# Patient Record
Sex: FEMALE | Race: WHITE | NOT HISPANIC OR LATINO | Employment: OTHER | URBAN - METROPOLITAN AREA
[De-identification: names, ages, dates, MRNs, and addresses within clinical notes are randomized per-mention and may not be internally consistent; named-entity substitution may affect disease eponyms.]

---

## 2018-07-24 ENCOUNTER — TRANSFERRED RECORDS (OUTPATIENT)
Dept: HEALTH INFORMATION MANAGEMENT | Facility: CLINIC | Age: 28
End: 2018-07-24

## 2018-12-27 ENCOUNTER — TRANSFERRED RECORDS (OUTPATIENT)
Dept: HEALTH INFORMATION MANAGEMENT | Facility: CLINIC | Age: 28
End: 2018-12-27

## 2019-05-09 ENCOUNTER — OFFICE VISIT (OUTPATIENT)
Dept: FAMILY MEDICINE | Facility: CLINIC | Age: 29
End: 2019-05-09
Payer: COMMERCIAL

## 2019-05-09 VITALS
RESPIRATION RATE: 17 BRPM | WEIGHT: 153.7 LBS | SYSTOLIC BLOOD PRESSURE: 120 MMHG | HEART RATE: 92 BPM | DIASTOLIC BLOOD PRESSURE: 68 MMHG | TEMPERATURE: 97.8 F | OXYGEN SATURATION: 98 %

## 2019-05-09 DIAGNOSIS — Z97.8 PRESENCE OF INTRATHECAL BACLOFEN PUMP: ICD-10-CM

## 2019-05-09 DIAGNOSIS — G80.0 SPASTIC QUADRIPLEGIC CEREBRAL PALSY (H): ICD-10-CM

## 2019-05-09 DIAGNOSIS — Z00.00 ROUTINE HISTORY AND PHYSICAL EXAMINATION OF ADULT: Primary | ICD-10-CM

## 2019-05-09 DIAGNOSIS — E78.5 HYPERLIPIDEMIA LDL GOAL <160: ICD-10-CM

## 2019-05-09 PROCEDURE — 99385 PREV VISIT NEW AGE 18-39: CPT | Performed by: INTERNAL MEDICINE

## 2019-05-09 ASSESSMENT — ENCOUNTER SYMPTOMS
PARESTHESIAS: 0
DIZZINESS: 0
FEVER: 0
DYSURIA: 0
SHORTNESS OF BREATH: 0
NERVOUS/ANXIOUS: 0
DIARRHEA: 0
EYE PAIN: 0
HEMATOCHEZIA: 0
ARTHRALGIAS: 0
CHILLS: 0
BREAST MASS: 0
HEMATURIA: 0
COUGH: 0
FREQUENCY: 0
HEADACHES: 0
CONSTIPATION: 1
ABDOMINAL PAIN: 0
SORE THROAT: 0
JOINT SWELLING: 0
MYALGIAS: 0
NAUSEA: 0
WEAKNESS: 0
HEARTBURN: 0
PALPITATIONS: 0

## 2019-05-09 NOTE — PROGRESS NOTES
Present to establish primary care.   She is followed by [facility] for her Cerebral Palsy with spasticity.     Baclofen pump managed by [facility].  Accompanied by her mother/guardian    [facility]- Preventive visit to establish care.    SUBJECTIVE:   Madeline Person is a 29 year old female who presents for Preventive Visit.    Are you in the first 12 months of your Medicare coverage?  No    Healthy Habits:     Getting at least 3 servings of Calcium per day:  Yes    Bi-annual eye exam:  Yes    Dental care twice a year:  NO    Sleep apnea or symptoms of sleep apnea:  None    Diet:  Regular (no restrictions)    Frequency of exercise:  2-3 days/week    Duration of exercise:  Less than 15 minutes    Taking medications regularly:  Yes    Medication side effects:  Not applicable    PHQ-2 Total Score: 0    Do you feel safe in your environment? Yes    Do you have a Health Care Directive? No: Advance care planning was reviewed with patient; patient declined at this time.      Fall risk  Fallen 2 or more times in the past year?: No  Any fall with injury in the past year?: No    Cognitive Screening Not appropriate due to cerebral palsy    Pt was able to repeat 3 words right after they were said to her.     Do you have sleep apnea, excessive snoring or daytime drowsiness?: no    Reviewed and updated as needed this visit by clinical staff  Tobacco  Allergies  Meds  Problems  Med Hx  Surg Hx  Fam Hx  Soc Hx          Reviewed and updated as needed this visit by Provider  Tobacco  Allergies  Meds  Problems  Med Hx  Surg Hx  Fam Hx        Social History     Tobacco Use     Smoking status: Never Smoker     Smokeless tobacco: Never Used   Substance Use Topics     Alcohol use: No         Alcohol Use 5/9/2019   Prescreen: >3 drinks/day or >7 drinks/week? Not Applicable       Records requested - NIKO done      Current providers sharing in care for this patient include:   Patient Care Team:  Rosemary Johnson,  MD as PCP - General (Internal Medicine)  Cleo Bass MD as MD (Pediatrics)    The following health maintenance items are reviewed in Epic and correct as of today:  Health Maintenance   Topic Date Due     HIV SCREEN (SYSTEM ASSIGNED)  04/01/2009     PAP SCREENING Q3 YR (SYSTEM ASSIGNED)  04/01/2012     DTAP/TDAP/TD IMMUNIZATION (2 - Tdap) 04/01/2016     PREVENTIVE CARE VISIT  05/09/2020     ZOSTER IMMUNIZATION (1 of 2) 04/01/2041     PHQ-2  Completed     INFLUENZA VACCINE  Completed     IPV IMMUNIZATION  Aged Out     MENINGITIS IMMUNIZATION  Aged Out     Labs reviewed in EPIC  BP Readings from Last 3 Encounters:   05/09/19 120/68   04/09/15 120/78    Wt Readings from Last 3 Encounters:   05/09/19 69.7 kg (153 lb 11.2 oz)   04/09/15 62.4 kg (137 lb 9.1 oz)                  There is no problem list on file for this patient.    Past Surgical History:   Procedure Laterality Date     ------------OTHER-------------      Baclofen Pump Insertion     ------------OTHER-------------      bilateral femur osteotomy     EXAM UNDER ANESTHESIA, RESTORATIONS, EXTRACTION(S) DENTAL COMPLEX, COMBINED N/A 4/9/2015    Procedure: COMBINED EXAM UNDER ANESTHESIA, RESTORATIONS, EXTRACTION(S) DENTAL COMPLEX;  Surgeon: Angelica Rubin DDS;  Location: [facility] OR     FOOT SURGERY         Social History     Tobacco Use     Smoking status: Never Smoker     Smokeless tobacco: Never Used   Substance Use Topics     Alcohol use: No     History reviewed. No pertinent family history.      Current Outpatient Medications   Medication Sig Dispense Refill     Bisacodyl (DULCOLAX RE)        Multiple Vitamins-Minerals (MULTIVITAMIN GUMMIES ADULT PO) Take by mouth daily       NONFORMULARY 105.2 mcg ONLY on  day(s) in admin instructions 105.2 mcg/day       Allergies   Allergen Reactions     Latex      Other reaction(s): Unknown     Seasonal      Other reaction(s): Unknown       Review of Systems   Constitutional: Negative for chills and fever.   HENT:  Negative for congestion, ear pain, hearing loss and sore throat.    Eyes: Negative for pain and visual disturbance.   Respiratory: Negative for cough and shortness of breath.    Cardiovascular: Negative for chest pain, palpitations and peripheral edema.   Gastrointestinal: Positive for constipation. Negative for abdominal pain, diarrhea, heartburn, hematochezia and nausea.   Breasts:  Negative for tenderness, breast mass and discharge.   Genitourinary: Negative for dysuria, frequency, genital sores, hematuria, pelvic pain, urgency, vaginal bleeding and vaginal discharge.   Musculoskeletal: Negative for arthralgias, joint swelling and myalgias.   Skin: Negative for rash.   Neurological: Negative for dizziness, weakness, headaches and paresthesias.   Psychiatric/Behavioral: Negative for mood changes. The patient is not nervous/anxious.          OBJECTIVE:   /68   Pulse 92   Temp 97.8  F (36.6  C) (Tympanic)   Resp 17   Wt 69.7 kg (153 lb 11.2 oz)   LMP 04/09/2019 (Approximate)   SpO2 98%  There is no height or weight on file to calculate BMI.  Physical Exam  GENERAL: seated in wheelchair, accompanied by mother.  EYES: Eyes grossly normal to inspection and she wears glasses  HENT: normal cephalic/atraumatic, both ears: Tympanic membranes are obscured by soft cerumen, nose and mouth without ulcers or lesions, oropharynx clear, oral mucous membranes moist and drooling is prominent  NECK: no adenopathy, no asymmetry, masses, or scars and thyroid normal to palpation  RESP: lungs clear to auscultation - no rales, rhonchi or wheezes  CV: regular rates and rhythm, normal S1 S2, no S3 or S4, no murmur, click or rub, peripheral pulses strong and no peripheral edema  ABDOMEN: soft, nontender, bowel sounds normal and baclofen pump is palpated right side of her abdomen  MS: She is seated in a wheelchair and has bilateral AFOs in place; no edema noted.  She is nonambulatory  SKIN: no suspicious lesions or rashes  NEURO:  Cerebral palsy, increased spasticity in all extremities.  She is nonambulatory.  Motor transportation is wheelchair  PSYCH: She is alert accompanied by her mother/guardian    Of note, she is an identical twin    ASSESSMENT / PLAN:       ICD-10-CM    1. Routine history and physical examination of adult Z00.00    2. Hyperlipidemia LDL goal <160 E78.5 Comprehensive metabolic panel     Lipid panel reflex to direct LDL Fasting   3. 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 Presence of intrathecal baclofen pump Z96.89     managed by [facility]; per mother/guardian- pump filled every 6 months      Her cerebral palsy and baclofen pump is managed by the [facility] in [facility].  Her pump  is failed every 6 months.  Mother is working on getting a dental cleaning appointment done to [facility].  This has been done successfully under anesthesia in the past with look to do the same this year.  Once that appointment has been scheduled she will call back to coordinate a preoperative history and physical which is needed for anesthesia component.  Since Madeline is in a wheelchair and activity/exercise is limited, we briefly discussed healthy management of her weight.  She/mother will continue to work on healthy portions and look into ways for her to be more active.  Immunization records have been requested from [facility].  He will likely need a tetanus booster in the next year.  Also records will be reviewed once received..    End of Life Planning:  Patient currently has an advanced directive: as noted    COUNSELING:  Reviewed preventive health counseling, as reflected in patient instructions       Regular exercise       Healthy diet/nutrition       Immunizations    Records requested.           BP Readings from Last 1 Encounters:   05/09/19 120/68     There is no height or weight on file to calculate  BMI.      Weight management plan: Discussed healthy diet and exercise guidelines     reports that she has never smoked. She has never used smokeless tobacco.      Appropriate preventive services were discussed with this patient, including applicable screening as appropriate for cardiovascular disease, diabetes, osteopenia/osteoporosis, and glaucoma.  As appropriate for age/gender, discussed screening for colorectal cancer, prostate cancer, breast cancer, and cervical cancer. Checklist reviewing preventive services available has been given to the patient.    Reviewed patients plan of care and provided an AVS. The Basic Care Plan (routine screening as documented in Health Maintenance) for Madeline meets the Care Plan requirement. This Care Plan has been established and reviewed with the Patient and caregiver.    Counseling Resources:  ATP IV Guidelines  Pooled Cohorts Equation Calculator  Breast Cancer Risk Calculator  FRAX Risk Assessment  ICSI Preventive Guidelines  Dietary Guidelines for Americans, 2010  USDA's MyPlate  ASA Prophylaxis  Lung CA Screening    Rosemary Johnson MD  Internal Medicine   [facility]    Identified Health Risks:

## 2019-05-10 ENCOUNTER — DOCUMENTATION ONLY (OUTPATIENT)
Dept: FAMILY MEDICINE | Facility: CLINIC | Age: 29
End: 2019-05-10

## 2019-05-13 ENCOUNTER — DOCUMENTATION ONLY (OUTPATIENT)
Dept: FAMILY MEDICINE | Facility: CLINIC | Age: 29
End: 2019-05-13

## 2019-05-13 NOTE — PROGRESS NOTES
Recd records from [facility] 05/13/19 and forwarded to Lima Johnson for review and scanning

## 2019-05-14 DIAGNOSIS — G80.0 SPASTIC QUADRIPLEGIC CEREBRAL PALSY (H): ICD-10-CM

## 2019-05-14 DIAGNOSIS — E78.5 HYPERLIPIDEMIA LDL GOAL <160: ICD-10-CM

## 2019-05-14 LAB — HGB BLD-MCNC: 13.6 G/DL (ref 11.7–15.7)

## 2019-05-14 PROCEDURE — 85018 HEMOGLOBIN: CPT | Performed by: INTERNAL MEDICINE

## 2019-05-14 PROCEDURE — 84443 ASSAY THYROID STIM HORMONE: CPT | Performed by: INTERNAL MEDICINE

## 2019-05-14 PROCEDURE — 80053 COMPREHEN METABOLIC PANEL: CPT | Performed by: INTERNAL MEDICINE

## 2019-05-14 PROCEDURE — 36415 COLL VENOUS BLD VENIPUNCTURE: CPT | Performed by: INTERNAL MEDICINE

## 2019-05-14 PROCEDURE — 80061 LIPID PANEL: CPT | Performed by: INTERNAL MEDICINE

## 2019-05-15 LAB
ALBUMIN SERPL-MCNC: 4.1 G/DL (ref 3.4–5)
ALP SERPL-CCNC: 72 U/L (ref 40–150)
ALT SERPL W P-5'-P-CCNC: 29 U/L (ref 0–50)
ANION GAP SERPL CALCULATED.3IONS-SCNC: 7 MMOL/L (ref 3–14)
AST SERPL W P-5'-P-CCNC: 17 U/L (ref 0–45)
BILIRUB SERPL-MCNC: 0.2 MG/DL (ref 0.2–1.3)
BUN SERPL-MCNC: 16 MG/DL (ref 7–30)
CALCIUM SERPL-MCNC: 9.2 MG/DL (ref 8.5–10.1)
CHLORIDE SERPL-SCNC: 108 MMOL/L (ref 94–109)
CHOLEST SERPL-MCNC: 148 MG/DL
CO2 SERPL-SCNC: 23 MMOL/L (ref 20–32)
CREAT SERPL-MCNC: 0.58 MG/DL (ref 0.52–1.04)
GFR SERPL CREATININE-BSD FRML MDRD: >90 ML/MIN/{1.73_M2}
GLUCOSE SERPL-MCNC: 89 MG/DL (ref 70–99)
HDLC SERPL-MCNC: 43 MG/DL
LDLC SERPL CALC-MCNC: 89 MG/DL
NONHDLC SERPL-MCNC: 105 MG/DL
POTASSIUM SERPL-SCNC: 4.6 MMOL/L (ref 3.4–5.3)
PROT SERPL-MCNC: 8 G/DL (ref 6.8–8.8)
SODIUM SERPL-SCNC: 138 MMOL/L (ref 133–144)
TRIGL SERPL-MCNC: 78 MG/DL
TSH SERPL DL<=0.005 MIU/L-ACNC: 1.78 MU/L (ref 0.4–4)

## 2019-05-28 ENCOUNTER — TELEPHONE (OUTPATIENT)
Dept: FAMILY MEDICINE | Facility: CLINIC | Age: 29
End: 2019-05-28

## 2019-05-28 NOTE — TELEPHONE ENCOUNTER
Reason for call:  Results   Name of test or procedure: labs  Date of test or procedure: 05/14/2019  Location of test or procedure:  Clinic    Additional comments: would like a call to get results    Phone number to reach patient:  Cell number on file:    Telephone Information:   Mobile [phone]       Best Time:  anytime    Can we leave a detailed message on this number?  YES

## 2019-05-28 NOTE — TELEPHONE ENCOUNTER
Called and tried to leave a message at the number given below, but the mailbox is full.      Called and talked to her mom, Dahlia on other line.  Advised of recent lab work.

## 2019-06-06 ENCOUNTER — TRANSFERRED RECORDS (OUTPATIENT)
Dept: HEALTH INFORMATION MANAGEMENT | Facility: CLINIC | Age: 29
End: 2019-06-06

## 2019-06-26 ENCOUNTER — OFFICE VISIT (OUTPATIENT)
Dept: FAMILY MEDICINE | Facility: CLINIC | Age: 29
End: 2019-06-26
Payer: COMMERCIAL

## 2019-06-26 VITALS
HEART RATE: 90 BPM | RESPIRATION RATE: 14 BRPM | TEMPERATURE: 98.9 F | SYSTOLIC BLOOD PRESSURE: 116 MMHG | OXYGEN SATURATION: 98 % | WEIGHT: 153 LBS | DIASTOLIC BLOOD PRESSURE: 72 MMHG

## 2019-06-26 DIAGNOSIS — Z97.8 PRESENCE OF INTRATHECAL BACLOFEN PUMP: ICD-10-CM

## 2019-06-26 DIAGNOSIS — G80.0 SPASTIC QUADRIPLEGIC CEREBRAL PALSY (H): ICD-10-CM

## 2019-06-26 DIAGNOSIS — Z01.818 ENCOUNTER FOR PREOPERATIVE DENTAL EXAMINATION: Primary | ICD-10-CM

## 2019-06-26 DIAGNOSIS — Z01.818 PREOP GENERAL PHYSICAL EXAM: ICD-10-CM

## 2019-06-26 PROCEDURE — 99214 OFFICE O/P EST MOD 30 MIN: CPT | Performed by: INTERNAL MEDICINE

## 2019-06-26 NOTE — PROGRESS NOTES
[facility]  [address] [id number] [phone]  Dept: [phone]    PRE-OP EVALUATION:  Today's date: 2019    Madeline Person (: 1990) presents for pre-operative evaluation assessment as requested by Dr. Angelica Rubin.  She requires evaluation and anesthesia risk assessment prior to undergoing surgery/procedure for treatment of dental exam, radiographs, restorations, periodontal therapy, extractions, biopsy .    Fax number for surgical facility: [phone] & [phone]  Primary Physician: Rosemary Johnson  Type of Anesthesia Anticipated: General    Patient has a Health Care Directive or Living Will:  NO    Preop Questions 2019   Who is doing your surgery? Angelica Rubin DDS   What are you having done? dental exam, radiographs, restorations, periodontal therapy, extractios, biopsy   Date of Surgery/Procedure: 2019   Facility or Hospital where procedure/surgery will be performed: [facility])   1.  Do you have a history of Heart attack, stroke, stent, coronary bypass surgery, or other heart surgery? No   2.  Do you ever have any pain or discomfort in your chest? No   3.  Do you have a history of  Heart Failure? No   4.   Are you troubled by shortness of breath when:  walking on a level surface, or up a slight hill, or at night? No   5.  Do you currently have a cold, bronchitis or other respiratory infection? No         HPI:     HPI related to upcoming procedure: Madeline Person is a 29 year old female who presents for evaluation prior to anesthesia prior to dental work; she is in need of wisdom teeth extraction and removal of plaque from her teeth. An exam under anesthesia is advised due to Cerebral palsy and associated spastic quadriplegia. She does have a baclofen pump.           MEDICAL HISTORY:   There are no active problems to display for this patient.     Past  Medical History:   Diagnosis Date     Cerebral palsy (H)      Spastic quadriplegia (H)      Past Surgical History:   Procedure Laterality Date     ------------OTHER-------------      Baclofen Pump Insertion     ------------OTHER-------------      bilateral femur osteotomy     EXAM UNDER ANESTHESIA, RESTORATIONS, EXTRACTION(S) DENTAL COMPLEX, COMBINED N/A 4/9/2015    Procedure: COMBINED EXAM UNDER ANESTHESIA, RESTORATIONS, EXTRACTION(S) DENTAL COMPLEX;  Surgeon: Angelica Rubin DDS;  Location: [facility] OR     FOOT SURGERY       Current Outpatient Medications   Medication Sig Dispense Refill     Bisacodyl (DULCOLAX RE)        cholecalciferol (VITAMIN D-1000 MAX ST) 1000 units TABS Take 1,000 Units by mouth       Multiple Vitamins-Minerals (MULTIVITAMIN GUMMIES ADULT PO) Take by mouth daily       NONFORMULARY 105.2 mcg ONLY on  day(s) in admin instructions 105.2 mcg/day       OTC products: None, except as noted above    Allergies   Allergen Reactions     Latex      Other reaction(s): Unknown     Seasonal      Other reaction(s): Unknown      Latex Allergy: YES: Precautions to take: latex free environment; mother not aware of true allergy reaction;     Social History     Tobacco Use     Smoking status: Never Smoker     Smokeless tobacco: Never Used   Substance Use Topics     Alcohol use: No     History   Drug Use No       REVIEW OF SYSTEMS:   CONSTITUTIONAL: NEGATIVE for fever, chills, change in weight  ENT/MOUTH: NEGATIVE for ear, mouth and throat problems  RESP: NEGATIVE for significant cough or SOB  CV: NEGATIVE for chest pain, palpitations or peripheral edema  : incontinent of urine  MUSCULOSKELETAL: in wheelchair due to spastic quadriplegia.   NEURO: cerebral palsy with spasticity;  Wheel chair  HEME/ALLERGY/IMMUNE: NEGATIVE for bleeding problems  PSYCHIATRIC: NEGATIVE for changes in mood or affect    EXAM:   /72 (BP Location: Right arm, Patient Position: Sitting, Cuff Size: Adult Large)   Pulse 90   Temp  98.9  F (37.2  C) (Axillary)   Resp 14   Wt 69.4 kg (153 lb)   SpO2 98%     GENERAL APPEARANCE: seated in wheelchair; mother pushes wheelchair     EYES: pupils equal and reactive     NECK: no adenopathy, no asymmetry, masses, or scars and thyroid normal to palpation     RESP: lungs clear to auscultation - no rales, rhonchi or wheezes     CV: regular rates and rhythm     ABDOMEN: bowel sounds normal     MS: seated in wheelchair;spasticity of all extremities;      NEURO: spasticity of extremities     PSYCH: mentation appears normal. and affect normal/bright    DIAGNOSTICS:   No labs or EKG required for low risk surgery (cataract, skin procedure, breast biopsy, etc)    Recent Labs   Lab Test 05/14/19  0736   HGB 13.6      POTASSIUM 4.6   CR 0.58        IMPRESSION:   Reason for surgery/procedure: anesthesia needed for dental cleaning  Diagnosis/reason for consult: anesthesia needed for dental cleaning; Cerebral Palsy with spasticity    The proposed surgical procedure is considered LOW risk.    REVISED CARDIAC RISK INDEX  The patient has the following serious cardiovascular risks for perioperative complications such as (MI, PE, VFib and 3  AV Block):  No serious cardiac risks  INTERPRETATION: 0 risks: Class I (very low risk - 0.4% complication rate)    The patient has the following additional risks for perioperative complications:  No identified additional risks      ICD-10-CM    1. Encounter for preoperative dental examination Z01.818    2. Preop general physical exam Z01.818    3. 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 Presence of intrathecal baclofen pump Z96.89        RECOMMENDATIONS:     --Approval given to proceed with proposed procedure, without further diagnostic evaluation  --Pt advised to avoid NSAIDS (Motrin, Ibuprofen, Aleve or Naprosyn);  If needed, Tylenol or Acetaminophen are fine to use.--Pain medications, time off from work and FMLA following surgery deferred to surgeon.   --hold  meds on  AM of surgery  APPROVAL GIVEN to proceed with proposed procedure, without further diagnostic evaluation       Signed Electronically by: MD Rosemary Vides MD  Internal Medicine  electronically signed      Copy of this evaluation report is provided to requesting physician.    [facility] Preop Guidelines    Revised Cardiac Risk Index

## 2019-07-01 ENCOUNTER — HOSPITAL ENCOUNTER (OUTPATIENT)
Facility: CLINIC | Age: 29
End: 2019-07-01
Attending: DENTIST | Admitting: DENTIST
Payer: COMMERCIAL

## 2019-08-06 ENCOUNTER — TELEPHONE (OUTPATIENT)
Dept: FAMILY MEDICINE | Facility: CLINIC | Age: 29
End: 2019-08-06

## 2019-08-06 NOTE — TELEPHONE ENCOUNTER
Reason for Call:  Form, our goal is to have forms completed with 72 hours, however, some forms may require a visit or additional information.    Type of letter, form or note:  hunting    Who is the form from?: Patient    Where did the form come from: Patient or family brought in       What clinic location was the form placed at?: [place]    Where the form was placed: [facility] at      What number is listed as a contact on the form?: phone. [phone]       Additional comments: Dahlia (mom) will  form. She would like to pick it up tomorrow morning if possible.     Call taken on 8/6/2019 at 3:36 PM by Briana Calzada

## 2019-08-12 NOTE — TELEPHONE ENCOUNTER
Mother is calling wondering the status on why this form hasn't been completed yet. Please advise further. Thanks!

## 2019-09-17 ENCOUNTER — TRANSFERRED RECORDS (OUTPATIENT)
Dept: HEALTH INFORMATION MANAGEMENT | Facility: CLINIC | Age: 29
End: 2019-09-17

## 2019-10-23 ENCOUNTER — ALLIED HEALTH/NURSE VISIT (OUTPATIENT)
Dept: NURSING | Facility: CLINIC | Age: 29
End: 2019-10-23
Payer: COMMERCIAL

## 2019-10-23 DIAGNOSIS — Z23 NEED FOR PROPHYLACTIC VACCINATION AND INOCULATION AGAINST INFLUENZA: Primary | ICD-10-CM

## 2019-10-23 PROCEDURE — 90686 IIV4 VACC NO PRSV 0.5 ML IM: CPT

## 2019-10-23 PROCEDURE — 90471 IMMUNIZATION ADMIN: CPT

## 2019-10-23 PROCEDURE — 99207 ZZC NO CHARGE NURSE ONLY: CPT

## 2019-11-05 ENCOUNTER — TRANSFERRED RECORDS (OUTPATIENT)
Dept: HEALTH INFORMATION MANAGEMENT | Facility: CLINIC | Age: 29
End: 2019-11-05

## 2019-11-07 ENCOUNTER — TRANSFERRED RECORDS (OUTPATIENT)
Dept: HEALTH INFORMATION MANAGEMENT | Facility: CLINIC | Age: 29
End: 2019-11-07

## 2019-11-20 ENCOUNTER — TRANSFERRED RECORDS (OUTPATIENT)
Dept: HEALTH INFORMATION MANAGEMENT | Facility: CLINIC | Age: 29
End: 2019-11-20

## 2019-12-09 ENCOUNTER — TRANSFERRED RECORDS (OUTPATIENT)
Dept: HEALTH INFORMATION MANAGEMENT | Facility: CLINIC | Age: 29
End: 2019-12-09

## 2020-01-06 ENCOUNTER — OFFICE VISIT (OUTPATIENT)
Dept: FAMILY MEDICINE | Facility: CLINIC | Age: 30
End: 2020-01-06
Payer: COMMERCIAL

## 2020-01-06 VITALS
SYSTOLIC BLOOD PRESSURE: 118 MMHG | DIASTOLIC BLOOD PRESSURE: 76 MMHG | RESPIRATION RATE: 16 BRPM | HEART RATE: 100 BPM | TEMPERATURE: 97.8 F | WEIGHT: 147 LBS | OXYGEN SATURATION: 94 %

## 2020-01-06 DIAGNOSIS — Z01.818 ENCOUNTER FOR PREOPERATIVE DENTAL EXAMINATION: ICD-10-CM

## 2020-01-06 DIAGNOSIS — G80.0 SPASTIC QUADRIPLEGIC CEREBRAL PALSY (H): ICD-10-CM

## 2020-01-06 DIAGNOSIS — Z01.818 PREOP GENERAL PHYSICAL EXAM: Primary | ICD-10-CM

## 2020-01-06 DIAGNOSIS — Z97.8 PRESENCE OF INTRATHECAL BACLOFEN PUMP: ICD-10-CM

## 2020-01-06 DIAGNOSIS — Z02.9 ADMINISTRATIVE ENCOUNTER: ICD-10-CM

## 2020-01-06 PROCEDURE — 99215 OFFICE O/P EST HI 40 MIN: CPT | Performed by: INTERNAL MEDICINE

## 2020-01-06 NOTE — Clinical Note
Will need to fax preop and lab results to [facility] after 1/23/2020 labs back. ( needed to be done within 7 days of neurosurgery procedure. )[facility] dental procedure evaluation is available via [employer].

## 2020-01-06 NOTE — PROGRESS NOTES
[facility]  [address] [facility] [id number] [phone]  Dept: [phone]    PRE-OP EVALUATION:  Today's date: 2020    Madeline Person (: 1990) presents for pre-operative evaluation assessment as requested by Dr. Rosemary Miller.  She requires evaluation and anesthesia risk assessment prior to undergoing surgery/procedure for treatment of pump catheter placement and battery life declining.    She also requires evaluation and anesthesia risk assessment as requested by Blair Borges DDS  prior to undergoing surgery/procedure for treatment of dental exam, radiographs, restorations, periodontal therapy, extractions, biopsy . This surgery will be done at the [facility] under general anesthesia. preop accessible via [facility]    Fax number for surgical facility: [phone]  Primary Physician: Rosemary Johnson  Type of Anesthesia Anticipated: General    Patient has a Health Care Directive or Living Will:  NO    Preop Questions 2020   Who is doing your surgery? Dr Rosemary Miller,Neurosurgeon   What are you having done? implantation of new ITB catheter and replacement of ITB pump   Date of Surgery/Procedure: 2020   Facility or Hospital where procedure/surgery will be performed: [facility]   1.  Do you have a history of Heart attack, stroke, stent, coronary bypass surgery, or other heart surgery? No   2.  Do you ever have any pain or discomfort in your chest? UNKNOWN    3.  Do you have a history of  Heart Failure? No   4.   Are you troubled by shortness of breath when:  walking on a level surface, or up a slight hill, or at night? UNKNOWN - pt is unable to walk and is transported in a wheelchair. No SOB noted per mother    5.  Do you currently have a cold, bronchitis or other respiratory infection? No   6.  Do you have a cough, shortness of breath, or wheezing? No   7.  Do you sometimes get pains in the calves of your legs when you walk?  UNKNOWN - pt does not ambulate   8. Do you or anyone in your family have previous history of blood clots? No   9.  Do you or does anyone in your family have a serious bleeding problem such as prolonged bleeding following surgeries or cuts? No   10. Have you ever had problems with anemia or been told to take iron pills? No   11. Have you had any abnormal blood loss such as black, tarry or bloody stools, or abnormal vaginal bleeding? No   12. Have you ever had a blood transfusion? YES - no complications    13. Have you or any of your relatives ever had problems with anesthesia? No   14. Do you have sleep apnea, excessive snoring or daytime drowsiness? No   15. Do you have any prosthetic heart valves? No   16. Do you have prosthetic joints? No   17. Is there any chance that you may be pregnant? No         HPI:     HPI related to upcoming procedure: Madeline Person is a 29 year old female who presents with Spastic Quadriplegic Cerebral Palsy.    She is experiencing issues with her intrathecal Baclofen pump requiring catheter repositioning and batter replacement.    In addition, she was previously scheduled to have dental evaluation and treatment under anesthesia done last July but dental team was unable to complete exam at that time.     Since preoperative evaluation and anesthesia assessment done yesterday, 1/6/2020, in anticipation of her Baclofen pump procedure, the dental team is now available to complete the dental evaluation as previously discussed.           See problem list for active medical problems.  Problems all longstanding and stable, except as noted/documented.  See ROS for pertinent symptoms related to these conditions.      MEDICAL HISTORY:   There are no active problems to display for this patient.     Past Medical History:   Diagnosis Date     Cerebral palsy (H)      Spastic quadriplegia (H)      Past Surgical History:   Procedure Laterality Date     ------------OTHER-------------      Baclofen  Pump Insertion     ------------OTHER-------------      bilateral femur osteotomy     EXAM UNDER ANESTHESIA, RESTORATIONS, EXTRACTION(S) DENTAL COMPLEX, COMBINED N/A 4/9/2015    Procedure: COMBINED EXAM UNDER ANESTHESIA, RESTORATIONS, EXTRACTION(S) DENTAL COMPLEX;  Surgeon: Angelica Rubin DDS;  Location: [facility] OR     FOOT SURGERY       Current Outpatient Medications   Medication Sig Dispense Refill     Bisacodyl (DULCOLAX RE)        cholecalciferol (VITAMIN D-1000 MAX ST) 1000 units TABS Take 1,000 Units by mouth       Multiple Vitamins-Minerals (MULTIVITAMIN GUMMIES ADULT PO) Take by mouth daily       NONFORMULARY 105.2 mcg ONLY on  day(s) in admin instructions 105.2 mcg/day       OTC products: None, except as noted above    Allergies   Allergen Reactions     Latex      Other reaction(s): Unknown     Seasonal      Other reaction(s): Unknown      Latex Allergy: NO    Social History     Tobacco Use     Smoking status: Never Smoker     Smokeless tobacco: Never Used   Substance Use Topics     Alcohol use: No     History   Drug Use No       REVIEW OF SYSTEMS:   CONSTITUTIONAL: NEGATIVE for fever, chills, change in weight  INTEGUMENTARY/SKIN: NEGATIVE for worrisome rashes, moles or lesions  EYES: NEGATIVE for vision changes or irritation  ENT/MOUTH: NEGATIVE for ear, mouth and throat problems  RESP: NEGATIVE for significant cough or SOB  CV: NEGATIVE for chest pain, palpitations or peripheral edema  GI: NEGATIVE for nausea, abdominal pain, heartburn, or change in bowel habits  MUSCULOSKELETAL: NEGATIVE for significant arthralgias or myalgia  NEURO: spastic quadriplegia cerebral palsy requiring manual wheel chair use.   HEME: NEGATIVE for bleeding problems  PSYCHIATRIC: NEGATIVE for changes in mood or affect    EXAM:   /76   Pulse 100   Temp 97.8  F (36.6  C) (Tympanic)   Resp 16   Wt 66.7 kg (147 lb)   SpO2 94%     GENERAL APPEARANCE: alert, seated in manual wheel chair, accompanied by mother.     EYES:  EOMI, PERRL     HENT: ear canals and TM's normal and nose and mouth without ulcers or lesions     NECK: no adenopathy, no asymmetry, masses, or scars and thyroid normal to palpation     RESP: lungs clear to auscultation - no rales, rhonchi or wheezes     CV: regular rates and rhythm, normal S1 S2,  and no murmur, click or rub     ABDOMEN:  soft, nontender, no HSM or masses and bowel sounds normal     MS: extremities normal- no gross deformities noted, no evidence of inflammation in joints, FROM in all extremities.     SKIN: no suspicious lesions or rashes     NEURO: increased spasticity of multiple muscles, seated in wheelchair.     LYMPHATICS: No cervical adenopathy    DIAGNOSTICS:   Labs recommended including INR, PTT, CBC and Qual HCG within 7 days of surgery- future lab order placed. They plan to have labs done 1/22 or 1/23/2020  Dental procedure planned within 24 hours of surgery; if labs needed, they will need to be done prior to dental surgery.    Recent Labs   Lab Test 05/14/19  0736   HGB 13.6      POTASSIUM 4.6   CR 0.58        IMPRESSION:   Reason for surgery/procedure: intrathecal baclofen pump needs to be replaced.    Need for dental evaluation and treatment under anesthesia.   Diagnosis/reason for consult:   1. Preop general physical exam    2. Spastic quadriplegic cerebral palsy (H)    3. Presence of intrathecal baclofen pump    4. Encounter for preoperative dental examination    5. [employer] Paperwork completed.          The proposed surgical procedure is considered LOW risk.    REVISED CARDIAC RISK INDEX  The patient has the following serious cardiovascular risks for perioperative complications such as (MI, PE, VFib and 3  AV Block):  No serious cardiac risks  INTERPRETATION: 0 risks: Class I (very low risk - 0.4% complication rate)    The patient has the following additional risks for perioperative complications:  No identified additional risks      ICD-10-CM    1. Preop general physical  exam Z01.818 INR     Partial thromboplastin time     CBC with platelets and differential     HCG qualitative   2. Spastic quadriplegic cerebral palsy (H) G80.0    3. Presence of intrathecal baclofen pump Z97.8     Baclofen pump catheter needs to be replaced and nearing end of battery life.   4. Encounter for preoperative dental examination Z01.818     spastic quadriplegia cerebral palsy, need dental evaluation and treatment done under general anesthesia   5. [employer] Paperwork completed.  Z02.9        RECOMMENDATIONS:     (Z01.818) Preop general physical exam  (primary encounter diagnosis)  Comment: now plans 2 different procedures, replacement of intrathecal Baclofen pump 1/28/2020 and dental evaluation under anesthesia on 1/8/2020  Plan: INR, Partial thromboplastin time, CBC with         platelets and differential, HCG qualitative        As noted    (G80.0) Spastic quadriplegic cerebral palsy (H)  Comment: increased spasticity, requires manual wheelchair for mobility; aided by mother.  Plan: wheelchair, need Baclofen Pump.    (Z97.8) Presence of intrathecal baclofen pump  Comment: Baclofen pump catheter needs to be replaced and nearing end of battery life.  Plan: as noted    (Z01.818) Encounter for preoperative dental examination  Comment: spastic quadriplegia cerebral palsy, need dental evaluation and treatment done under general anesthesia  Plan: as noted    (Z02.9) [employer] Paperwork completed.   Comment: completed paperwork as requested by pt and mother.  Information completed, signed and returned to pt /family to submit to [employer] [place]  Plan: pt/family will complete pt information and submit to [employer] [place]        --Patient is on no chronic oral medications.  She Is advised to not take NSAIDS one week prior.   --Defer plans for Baclofen pump removal to surgeon.    APPROVAL GIVEN to proceed with proposed procedure, without further diagnostic evaluation       Signed Electronically  by: MD Rosemary Vides MD  Internal Medicine  [facility]  electronically signed  40 minutes is spent with patient, over 50% of that time spent providing counselling, reassess for both surgeries and  discussing and reviewing meds and potential side effects.    Copy of this evaluation report is provided to requesting physician.    [place] Preop Guidelines    Revised Cardiac Risk Index

## 2020-01-08 ENCOUNTER — ANESTHESIA (OUTPATIENT)
Dept: SURGERY | Facility: CLINIC | Age: 30
End: 2020-01-08
Payer: COMMERCIAL

## 2020-01-08 ENCOUNTER — ANESTHESIA EVENT (OUTPATIENT)
Dept: SURGERY | Facility: CLINIC | Age: 30
End: 2020-01-08
Payer: COMMERCIAL

## 2020-01-08 ENCOUNTER — HOSPITAL ENCOUNTER (OUTPATIENT)
Facility: CLINIC | Age: 30
Discharge: HOME | End: 2020-01-08
Attending: DENTIST | Admitting: DENTIST
Payer: COMMERCIAL

## 2020-01-08 VITALS
WEIGHT: 147.49 LBS | HEART RATE: 69 BPM | SYSTOLIC BLOOD PRESSURE: 143 MMHG | RESPIRATION RATE: 12 BRPM | OXYGEN SATURATION: 97 % | TEMPERATURE: 98.6 F | DIASTOLIC BLOOD PRESSURE: 98 MMHG

## 2020-01-08 LAB — HCG UR QL: NEGATIVE

## 2020-01-08 PROCEDURE — 25000125 ZZHC RX 250

## 2020-01-08 PROCEDURE — 37000009 ZZH ANESTHESIA TECHNICAL FEE, EACH ADDTL 15 MIN: Performed by: DENTIST

## 2020-01-08 PROCEDURE — 81025 URINE PREGNANCY TEST: CPT | Performed by: ANESTHESIOLOGY

## 2020-01-08 PROCEDURE — 25000128 H RX IP 250 OP 636

## 2020-01-08 PROCEDURE — 25800030 ZZH RX IP 258 OP 636

## 2020-01-08 PROCEDURE — 71000014 ZZH RECOVERY PHASE 1 LEVEL 2 FIRST HR: Performed by: DENTIST

## 2020-01-08 PROCEDURE — 25000125 ZZHC RX 250: Performed by: NURSE ANESTHETIST, CERTIFIED REGISTERED

## 2020-01-08 PROCEDURE — 25000132 ZZH RX MED GY IP 250 OP 250 PS 637: Performed by: DENTIST

## 2020-01-08 PROCEDURE — 36000053 ZZH SURGERY LEVEL 2 EA 15 ADDTL MIN - UMMC: Performed by: DENTIST

## 2020-01-08 PROCEDURE — 25000132 ZZH RX MED GY IP 250 OP 250 PS 637: Performed by: NURSE ANESTHETIST, CERTIFIED REGISTERED

## 2020-01-08 PROCEDURE — 25000566 ZZH SEVOFLURANE, EA 15 MIN: Performed by: DENTIST

## 2020-01-08 PROCEDURE — 36000051 ZZH SURGERY LEVEL 2 1ST 30 MIN - UMMC: Performed by: DENTIST

## 2020-01-08 PROCEDURE — 71000027 ZZH RECOVERY PHASE 2 EACH 15 MINS: Performed by: DENTIST

## 2020-01-08 PROCEDURE — 37000008 ZZH ANESTHESIA TECHNICAL FEE, 1ST 30 MIN: Performed by: DENTIST

## 2020-01-08 PROCEDURE — 40000171 ZZH STATISTIC PRE-PROCEDURE ASSESSMENT III: Performed by: DENTIST

## 2020-01-08 RX ORDER — SODIUM CHLORIDE, SODIUM LACTATE, POTASSIUM CHLORIDE, CALCIUM CHLORIDE 600; 310; 30; 20 MG/100ML; MG/100ML; MG/100ML; MG/100ML
INJECTION, SOLUTION INTRAVENOUS CONTINUOUS
Status: DISCONTINUED | OUTPATIENT
Start: 2020-01-08 | End: 2020-01-08 | Stop reason: HOSPADM

## 2020-01-08 RX ORDER — FENTANYL CITRATE 50 UG/ML
INJECTION, SOLUTION INTRAMUSCULAR; INTRAVENOUS PRN
Status: DISCONTINUED | OUTPATIENT
Start: 2020-01-08 | End: 2020-01-08

## 2020-01-08 RX ORDER — OXYMETAZOLINE HYDROCHLORIDE 0.05 G/100ML
SPRAY NASAL PRN
Status: DISCONTINUED | OUTPATIENT
Start: 2020-01-08 | End: 2020-01-08

## 2020-01-08 RX ORDER — ONDANSETRON 2 MG/ML
INJECTION INTRAMUSCULAR; INTRAVENOUS PRN
Status: DISCONTINUED | OUTPATIENT
Start: 2020-01-08 | End: 2020-01-08

## 2020-01-08 RX ORDER — NALOXONE HYDROCHLORIDE 0.4 MG/ML
.1-.4 INJECTION, SOLUTION INTRAMUSCULAR; INTRAVENOUS; SUBCUTANEOUS
Status: DISCONTINUED | OUTPATIENT
Start: 2020-01-08 | End: 2020-01-08 | Stop reason: HOSPADM

## 2020-01-08 RX ORDER — KETOROLAC TROMETHAMINE 30 MG/ML
INJECTION, SOLUTION INTRAMUSCULAR; INTRAVENOUS PRN
Status: DISCONTINUED | OUTPATIENT
Start: 2020-01-08 | End: 2020-01-08

## 2020-01-08 RX ORDER — LIDOCAINE HYDROCHLORIDE 20 MG/ML
INJECTION, SOLUTION INFILTRATION; PERINEURAL PRN
Status: DISCONTINUED | OUTPATIENT
Start: 2020-01-08 | End: 2020-01-08

## 2020-01-08 RX ORDER — ONDANSETRON 2 MG/ML
4 INJECTION INTRAMUSCULAR; INTRAVENOUS EVERY 30 MIN PRN
Status: DISCONTINUED | OUTPATIENT
Start: 2020-01-08 | End: 2020-01-08 | Stop reason: HOSPADM

## 2020-01-08 RX ORDER — PROPOFOL 10 MG/ML
INJECTION, EMULSION INTRAVENOUS PRN
Status: DISCONTINUED | OUTPATIENT
Start: 2020-01-08 | End: 2020-01-08

## 2020-01-08 RX ORDER — FENTANYL CITRATE 50 UG/ML
25-50 INJECTION, SOLUTION INTRAMUSCULAR; INTRAVENOUS
Status: DISCONTINUED | OUTPATIENT
Start: 2020-01-08 | End: 2020-01-08 | Stop reason: HOSPADM

## 2020-01-08 RX ORDER — OXYCODONE HYDROCHLORIDE 5 MG/1
5 TABLET ORAL EVERY 4 HOURS PRN
Status: DISCONTINUED | OUTPATIENT
Start: 2020-01-08 | End: 2020-01-08 | Stop reason: HOSPADM

## 2020-01-08 RX ORDER — SODIUM CHLORIDE, SODIUM LACTATE, POTASSIUM CHLORIDE, CALCIUM CHLORIDE 600; 310; 30; 20 MG/100ML; MG/100ML; MG/100ML; MG/100ML
INJECTION, SOLUTION INTRAVENOUS CONTINUOUS PRN
Status: DISCONTINUED | OUTPATIENT
Start: 2020-01-08 | End: 2020-01-08

## 2020-01-08 RX ORDER — DEXAMETHASONE SODIUM PHOSPHATE 4 MG/ML
INJECTION, SOLUTION INTRA-ARTICULAR; INTRALESIONAL; INTRAMUSCULAR; INTRAVENOUS; SOFT TISSUE PRN
Status: DISCONTINUED | OUTPATIENT
Start: 2020-01-08 | End: 2020-01-08

## 2020-01-08 RX ORDER — ACETAMINOPHEN 325 MG/1
975 TABLET ORAL ONCE
Status: DISCONTINUED | OUTPATIENT
Start: 2020-01-08 | End: 2020-01-08 | Stop reason: HOSPADM

## 2020-01-08 RX ORDER — PROPOFOL 10 MG/ML
INJECTION, EMULSION INTRAVENOUS CONTINUOUS PRN
Status: DISCONTINUED | OUTPATIENT
Start: 2020-01-08 | End: 2020-01-08

## 2020-01-08 RX ORDER — CHLORHEXIDINE GLUCONATE ORAL RINSE 1.2 MG/ML
SOLUTION DENTAL PRN
Status: DISCONTINUED | OUTPATIENT
Start: 2020-01-08 | End: 2020-01-08 | Stop reason: HOSPADM

## 2020-01-08 RX ORDER — ONDANSETRON 4 MG/1
4 TABLET, ORALLY DISINTEGRATING ORAL EVERY 30 MIN PRN
Status: DISCONTINUED | OUTPATIENT
Start: 2020-01-08 | End: 2020-01-08 | Stop reason: HOSPADM

## 2020-01-08 RX ADMIN — DEXAMETHASONE SODIUM PHOSPHATE 4 MG: 4 INJECTION, SOLUTION INTRAMUSCULAR; INTRAVENOUS at 12:06

## 2020-01-08 RX ADMIN — FENTANYL CITRATE 50 MCG: 50 INJECTION, SOLUTION INTRAMUSCULAR; INTRAVENOUS at 11:58

## 2020-01-08 RX ADMIN — LIDOCAINE HYDROCHLORIDE 100 MG: 20 INJECTION, SOLUTION INFILTRATION; PERINEURAL at 11:55

## 2020-01-08 RX ADMIN — POLYETHYLENE GLYCOL 400 AND PROPYLENE GLYCOL 2 DROP: 4; 3 SOLUTION/ DROPS OPHTHALMIC at 12:03

## 2020-01-08 RX ADMIN — KETOROLAC TROMETHAMINE 30 MG: 30 INJECTION, SOLUTION INTRAMUSCULAR at 12:47

## 2020-01-08 RX ADMIN — ROCURONIUM BROMIDE 50 MG: 10 INJECTION INTRAVENOUS at 11:55

## 2020-01-08 RX ADMIN — MIDAZOLAM 2 MG: 1 INJECTION INTRAMUSCULAR; INTRAVENOUS at 11:37

## 2020-01-08 RX ADMIN — SODIUM CHLORIDE, POTASSIUM CHLORIDE, SODIUM LACTATE AND CALCIUM CHLORIDE: 600; 310; 30; 20 INJECTION, SOLUTION INTRAVENOUS at 12:31

## 2020-01-08 RX ADMIN — PROPOFOL 100 MG: 10 INJECTION, EMULSION INTRAVENOUS at 11:55

## 2020-01-08 RX ADMIN — SUGAMMADEX 200 MG: 100 INJECTION, SOLUTION INTRAVENOUS at 12:53

## 2020-01-08 RX ADMIN — PROPOFOL 25 MCG/KG/MIN: 10 INJECTION, EMULSION INTRAVENOUS at 12:10

## 2020-01-08 RX ADMIN — OXYMETAZOLINE HYDROCHLORIDE 2 SPRAY: 0.05 SPRAY NASAL at 11:59

## 2020-01-08 RX ADMIN — FENTANYL CITRATE 50 MCG: 50 INJECTION, SOLUTION INTRAMUSCULAR; INTRAVENOUS at 11:55

## 2020-01-08 RX ADMIN — ONDANSETRON 4 MG: 2 INJECTION INTRAMUSCULAR; INTRAVENOUS at 12:44

## 2020-01-08 NOTE — BRIEF OP NOTE
[facility], [place]    Brief Operative Note    Pre-operative diagnosis: Dental caries [K02.9]  Post-operative diagnosis Healthy    Procedure: Procedure(s):  Dental Exam, Radiographs, Periodontal Therapy, flouride varnish  Surgeon: Surgeon(s) and Role:     * Blair Borges DDS - Primary     * Dudley Pereira DDS - Resident - Assisting  Anesthesia: General   Estimated blood loss: 2 ml  Drains: None  Specimens: * No specimens in log *  Findings:   None.  Complications: None.  Implants: * No implants in log *

## 2020-01-08 NOTE — ANESTHESIA POSTPROCEDURE EVALUATION
Anesthesia POST Procedure Evaluation    Patient: Madeline Person   MRN:     [id number] Gender:   female   Age:    29 year old :      1990        Preoperative Diagnosis: Dental caries [K02.9]   Procedure(s):  Dental Exam, Radiographs, Periodontal Therapy, flouride varnish   Postop Comments: No value filed.       Anesthesia Type:  No value filed.  No value filed.    Reportable Event: NO     PAIN: Uncomplicated   Sign Out status: Comfortable, Well controlled pain     PONV: No PONV   Sign Out status:  No Nausea or Vomiting     Neuro/Psych: Uneventful perioperative course   Sign Out Status: Preoperative baseline; Age appropriate mentation     Airway/Resp.: Uneventful perioperative course   Sign Out Status: Non labored breathing, age appropriate RR; Resp. Status within EXPECTED Parameters     CV: Uneventful perioperative course   Sign Out status: Appropriate BP and perfusion indices; Appropriate HR/Rhythm     Disposition:   Sign Out in:  PACU  Disposition:  Phase II; Home  Recovery Course: Uneventful  Follow-Up: Not required     Comments/Narrative:  Pt at [facility] in PACU. Pain controlled. Mom at bedside.            Last Anesthesia Record Vitals:  CRNA VITALS  2020 1229 - 2020 1329      2020             Resp Rate (observed):  14          Last PACU Vitals:  Vitals Value Taken Time   /101 2020  1:15 PM   Temp     Pulse 115 2020  1:15 PM   Resp 10 2020  1:29 PM   SpO2 98 % 2020  1:29 PM   Temp src     NIBP     Pulse     SpO2     Resp     Temp     Ht Rate     Temp 2     Vitals shown include unvalidated device data.      Electronically Signed By: Sarah Wachter, MD, 2020, 1:30 PM

## 2020-01-08 NOTE — ANESTHESIA CARE TRANSFER NOTE
Patient: Madeline Person    Procedure(s):  Dental Exam, Radiographs, Periodontal Therapy, flouride varnish    Diagnosis: Dental caries [K02.9]  Diagnosis Additional Information: No value filed.    Anesthesia Type:   No value filed.     Note:  Airway :Room Air  Patient transferred to:PACU  Handoff Report: Identifed the Patient, Identified the Reponsible Provider, Reviewed the pertinent medical history, Discussed the surgical course, Reviewed Intra-OP anesthesia mangement and issues during anesthesia, Set expectations for post-procedure period and Allowed opportunity for questions and acknowledgement of understanding      Vitals: (Last set prior to Anesthesia Care Transfer)    CRNA VITALS  1/8/2020 1229 - 1/8/2020 1313      1/8/2020             Resp Rate (observed):  14                Electronically Signed By: Marcella Genao CRNA, APRN CRNA  January 8, 2020  1:13 PM

## 2020-01-08 NOTE — OP NOTE
Procedure Date: 01/08/2020      DENTAL SURGICAL PROCEDURES      It was deemed necessary for this patient to be seen in the hospital operating room because of cerebral palsy and the inability to be treated in a traditional dental clinic setting.  Risks and complications including but not limited to postoperative pain, swelling, bleeding, infection, temporary or permanent paresthesia, anesthesia of cranial nerve V3, lingual nerve, failure to resolve chief complaint, or need for additional procedures.  The patient's guardian agrees to procedure as written, signed consent in chart.      PROCEDURES PERFORMED:  Under general anesthesia, the following operations were performed in the mouth:   1. Bilateral dental examination.   2. Dental radiographs.   3. Prophylaxis.   4. Periodontal therapy   5. Fluoride varnish application.      ATTENDING PHYSICIAN:   Blair Borges DDS.      FIRST ASSISTANT DENTAL RESIDENT:  Dudley Pereira DDS.      ANESTHESIOLOGIST:  Sarah Wachter, MD.      SCRUB NURSE:  Raquel Steele.      CIRCULATING NURSE:  Misti Parks.      CRNA:  Marcella Genao.      PREOPERATIVE DIAGNOSIS:  Suspected periodontal disease and dental caries.      POSTOPERATIVE DIAGNOSIS:  Localized gingivitis.      DESCRIPTION OF PROCEDURE:   The patient was brought into the operating room and draped in the usual customary [facility] [place] fashion.  Following the timeout procedures, general anesthesia was administered through the patient's right naris.  A bilateral dental exam was performed and 4 bitewing radiographs were obtained and interpreted.  A moist throat pack was placed at 12:34.  Clinical examination revealed localized supragingival calculus on the mandibular anteriors, localized bleeding on probing, and periodontal pockets ranging from 2-3 mm with a 6-mm pocket on the distal #31.  Radiographic examination revealed normal bone trabeculation, anticipated levels of bone loss distal to  #31, and no coronal radiolucencies consistent with dental caries.  No local anesthetic was used.      The following procedures were performed:  Periodontal therapy was performed on all teeth using ultrasonic debridement, supragingival scaling, rubber cup polishing and flossing.  Fluoride varnish was applied to all teeth.      The throat pack was removed with suction at 12:48.  The oropharynx was inspected and found to be clear.  Estimated blood loss was 2 mL.      The attending doctor, Dr. Tran, was present for the entire procedure.  The patient was extubated in the operating room and taken to the postanesthesia care unit in good condition.                  MARYCARMEN TRAN DDS       As dictated by TEVIN MARIE DDS            D: 2020   T: 2020   MT:       Name:     JACQUELINE TIERNEY   MRN:      [id number]        Account:        [id number]   :      1990           Procedure Date: 2020      Document: [id number]

## 2020-01-08 NOTE — ANESTHESIA PREPROCEDURE EVALUATION
Anesthesia Pre-Procedure Evaluation    Patient: Madeline Person   MRN:     [id number] Gender:   female   Age:    29 year old :      1990        Preoperative Diagnosis: Dental caries [K02.9]   Procedure(s):  Dental Exam, Radiographs, Periodontal Therapy, flouride varnish     Past Medical History:   Diagnosis Date     Cerebral palsy (H)      Spastic quadriplegia (H)       Past Surgical History:   Procedure Laterality Date     ------------OTHER-------------      Baclofen Pump Insertion     ------------OTHER-------------      bilateral femur osteotomy     EXAM UNDER ANESTHESIA, RESTORATIONS, EXTRACTION(S) DENTAL COMPLEX, COMBINED N/A 2015    Procedure: COMBINED EXAM UNDER ANESTHESIA, RESTORATIONS, EXTRACTION(S) DENTAL COMPLEX;  Surgeon: Angelica Rubin DDS;  Location: [facility] OR     FOOT SURGERY       ORTHOPEDIC SURGERY            Anesthesia Evaluation     . Pt has had prior anesthetic. Type: General    No history of anesthetic complications          ROS/MED HX    ENT/Pulmonary:     (+)BENNIE risk factors obese, , . .    Neurologic: Comment: Cerebral palsy  Spastic quadriparesis        Cardiovascular:  - neg cardiovascular ROS       METS/Exercise Tolerance:     Hematologic:  - neg hematologic  ROS       Musculoskeletal:  - neg musculoskeletal ROS       GI/Hepatic:  - neg GI/hepatic ROS       Renal/Genitourinary:  - ROS Renal section negative       Endo:  - neg endo ROS       Psychiatric:  - neg psychiatric ROS       Infectious Disease:  - neg infectious disease ROS       Malignancy:      - no malignancy   Other:    (+) No chance of pregnancy                        PHYSICAL EXAM:   Mental Status/Neuro: Abnormal Mental Status  Abnormal Mental Status: Delayed   Airway: Facies: Feasible  Mallampati: II  Mouth/Opening: Full  TM distance: > 6 cm  Neck ROM: Full   Respiratory: Auscultation: CTAB     Resp. Rate: Normal     Resp. Effort: Normal      CV:    Comments:      Dental: Normal Dentition                 LABS:  CBC:   Lab Results   Component Value Date    WBC 12.8 (H) 07/04/2010    HGB 13.6 05/14/2019    HGB 13.5 07/04/2010    HCT 39.6 07/04/2010     07/04/2010     BMP:   Lab Results   Component Value Date     05/14/2019     07/04/2010    POTASSIUM 4.6 05/14/2019    POTASSIUM 3.6 07/04/2010    CHLORIDE 108 05/14/2019    CHLORIDE 102 07/04/2010    CO2 23 05/14/2019    CO2 26 07/04/2010    BUN 16 05/14/2019    BUN 8 07/04/2010    CR 0.58 05/14/2019    CR 0.54 07/04/2010    GLC 89 05/14/2019    GLC 93 07/04/2010     COAGS: No results found for: PTT, INR, FIBR  POC:   Lab Results   Component Value Date    HCG Negative 01/08/2020     OTHER:   Lab Results   Component Value Date    GRANT 9.2 05/14/2019    ALBUMIN 4.1 05/14/2019    PROTTOTAL 8.0 05/14/2019    ALT 29 05/14/2019    AST 17 05/14/2019    ALKPHOS 72 05/14/2019    BILITOTAL 0.2 05/14/2019    TSH 1.78 05/14/2019        Preop Vitals    BP Readings from Last 3 Encounters:   01/08/20 (!) 141/114   01/06/20 118/76   06/26/19 116/72    Pulse Readings from Last 3 Encounters:   01/08/20 118   01/06/20 100   06/26/19 90      Resp Readings from Last 3 Encounters:   01/08/20 12   01/06/20 16   06/26/19 14    SpO2 Readings from Last 3 Encounters:   01/08/20 100%   01/06/20 94%   06/26/19 98%      Temp Readings from Last 1 Encounters:   01/08/20 37  C (98.6  F) (Oral)    Ht Readings from Last 1 Encounters:   No data found for Ht      Wt Readings from Last 1 Encounters:   01/08/20 66.9 kg (147 lb 7.8 oz)    There is no height or weight on file to calculate BMI.     LDA:  Peripheral IV 01/08/20 Right Hand (Active)   Site Assessment WDL 1/8/2020  1:17 PM   Line Status Infusing 1/8/2020  1:17 PM   Phlebitis Scale 0-->no symptoms 1/8/2020 11:29 AM   Infiltration Scale 0 1/8/2020 11:29 AM   Infiltration Site Treatment Method  None 1/8/2020 11:29 AM   Extravasation? No 1/8/2020 11:29 AM   Dressing Intervention New dressing  1/8/2020 11:29 AM   Number of  days: 0        Assessment:   ASA SCORE: 3    H&P: History and physical reviewed and following examination; no interval change.   Smoking Status:  Non-Smoker/Unknown   NPO Status: NPO Appropriate     Plan:   Anes. Type:  General   Pre-Medication: None   Induction:  IV (Standard)   Airway: ETT; Oral   Access/Monitoring: PIV   Maintenance: Balanced     Postop Plan:   Postop Pain: Opioids  Postop Sedation/Airway: Not planned  Disposition: Outpatient     PONV Management:   Adult Risk Factors: Female, Non-Smoker, Postop Opioids   Prevention: Ondansetron, Dexamethasone     CONSENT: Direct conversation   Plan and risks discussed with: Patient; Mother   Blood Products: Consent Deferred (Minimal Blood Loss)                   Sarah Wachter, MD

## 2020-01-08 NOTE — DISCHARGE INSTRUCTIONS
[facility]   Adult Discharge Orders & Instructions     For 24 hours after surgery    1. Get plenty of rest.  A responsible adult must stay with you for at least 24 hours after you leave the hospital.   2. Do not drive or use heavy equipment.  If you have weakness or tingling, don't drive or use heavy equipment until this feeling goes away.  3. Do not drink alcohol.  4. Avoid strenuous or risky activities.  Ask for help when climbing stairs.   5. You may feel lightheaded.  IF so, sit for a few minutes before standing.  Have someone help you get up.   6. If you have nausea (feel sick to your stomach): Drink only clear liquids such as apple juice, ginger ale, broth or 7-Up.  Rest may also help.  Be sure to drink enough fluids.  Move to a regular diet as you feel able.  7. You may have a slight fever. Call the doctor if your fever is over 100 F (37.7 C) (taken under the tongue) or lasts longer than 24 hours.  8. You may have a dry mouth, a sore throat, muscle aches or trouble sleeping.  These should go away after 24 hours.  9. Do not make important or legal decisions.   Call your doctor for any of the followin.  Signs of infection (fever, growing tenderness at the surgery site, a large amount of drainage or bleeding, severe pain, foul-smelling drainage, redness, swelling).    2. It has been over 8 to 10 hours since surgery and you are still not able to urinate (pass water).    3.  Headache for over 24 hours.    4.  Numbness, tingling or weakness the day after surgery (if you had spinal anesthesia).  To contact a doctor, call ________________________________________

## 2020-01-22 DIAGNOSIS — Z01.818 PREOP GENERAL PHYSICAL EXAM: ICD-10-CM

## 2020-01-22 LAB
APTT PPP: 36 SEC (ref 22–37)
BASOPHILS # BLD AUTO: 0 10E9/L (ref 0–0.2)
BASOPHILS NFR BLD AUTO: 0.5 %
DIFFERENTIAL METHOD BLD: NORMAL
EOSINOPHIL # BLD AUTO: 0.1 10E9/L (ref 0–0.7)
EOSINOPHIL NFR BLD AUTO: 1.7 %
ERYTHROCYTE [DISTWIDTH] IN BLOOD BY AUTOMATED COUNT: 12.1 % (ref 10–15)
HCG SERPL QL: NEGATIVE
HCT VFR BLD AUTO: 39.8 % (ref 35–47)
HGB BLD-MCNC: 13.2 G/DL (ref 11.7–15.7)
INR PPP: 1.07 (ref 0.86–1.14)
LYMPHOCYTES # BLD AUTO: 2.5 10E9/L (ref 0.8–5.3)
LYMPHOCYTES NFR BLD AUTO: 37.4 %
MCH RBC QN AUTO: 29.4 PG (ref 26.5–33)
MCHC RBC AUTO-ENTMCNC: 33.2 G/DL (ref 31.5–36.5)
MCV RBC AUTO: 89 FL (ref 78–100)
MONOCYTES # BLD AUTO: 0.5 10E9/L (ref 0–1.3)
MONOCYTES NFR BLD AUTO: 7.2 %
NEUTROPHILS # BLD AUTO: 3.6 10E9/L (ref 1.6–8.3)
NEUTROPHILS NFR BLD AUTO: 53.2 %
PLATELET # BLD AUTO: 310 10E9/L (ref 150–450)
RBC # BLD AUTO: 4.49 10E12/L (ref 3.8–5.2)
WBC # BLD AUTO: 6.7 10E9/L (ref 4–11)

## 2020-01-22 PROCEDURE — 36415 COLL VENOUS BLD VENIPUNCTURE: CPT | Performed by: INTERNAL MEDICINE

## 2020-01-22 PROCEDURE — 85610 PROTHROMBIN TIME: CPT | Performed by: INTERNAL MEDICINE

## 2020-01-22 PROCEDURE — 85025 COMPLETE CBC W/AUTO DIFF WBC: CPT | Performed by: INTERNAL MEDICINE

## 2020-01-22 PROCEDURE — 85730 THROMBOPLASTIN TIME PARTIAL: CPT | Performed by: INTERNAL MEDICINE

## 2020-01-22 PROCEDURE — 84703 CHORIONIC GONADOTROPIN ASSAY: CPT | Performed by: INTERNAL MEDICINE

## 2020-01-28 ENCOUNTER — TRANSFERRED RECORDS (OUTPATIENT)
Dept: HEALTH INFORMATION MANAGEMENT | Facility: CLINIC | Age: 30
End: 2020-01-28

## 2020-02-05 ENCOUNTER — OFFICE VISIT (OUTPATIENT)
Dept: FAMILY MEDICINE | Facility: CLINIC | Age: 30
End: 2020-02-05
Payer: COMMERCIAL

## 2020-02-05 VITALS
OXYGEN SATURATION: 100 % | SYSTOLIC BLOOD PRESSURE: 124 MMHG | HEART RATE: 95 BPM | DIASTOLIC BLOOD PRESSURE: 62 MMHG | WEIGHT: 147 LBS | RESPIRATION RATE: 16 BRPM | TEMPERATURE: 97.8 F

## 2020-02-05 DIAGNOSIS — Z01.818 PREOP GENERAL PHYSICAL EXAM: Primary | ICD-10-CM

## 2020-02-05 DIAGNOSIS — Z97.8 PRESENCE OF INTRATHECAL BACLOFEN PUMP: ICD-10-CM

## 2020-02-05 DIAGNOSIS — G80.0 SPASTIC QUADRIPLEGIC CEREBRAL PALSY (H): ICD-10-CM

## 2020-02-05 LAB — HCG UR QL: NEGATIVE

## 2020-02-05 PROCEDURE — 99214 OFFICE O/P EST MOD 30 MIN: CPT | Performed by: INTERNAL MEDICINE

## 2020-02-05 PROCEDURE — 81025 URINE PREGNANCY TEST: CPT | Performed by: INTERNAL MEDICINE

## 2020-02-05 NOTE — PROGRESS NOTES
[facility]  [address] [facility] [address] [phone]  Dept: [phone]    PRE-OP EVALUATION:  Today's date: 2020    Madeline Person (: 1990) presents for pre-operative evaluation assessment as requested by Dr. Rosemary Miller.  She requires evaluation and anesthesia risk assessment prior to undergoing surgery/procedure for treatment of Removal of baclofen pump and catheter .    Proposed Surgery/ Procedure: removal of pump and catheter  Date of Surgery/ Procedure: 2020  Time of Surgery/ Procedure: 9:30 am  Hospital/Surgical Facility: [facility]  Fax number for surgical facility: [phone]  Primary Physician: Rosemary Johnson  Type of Anesthesia Anticipated: General    Patient has a Health Care Directive or Living Will:  NO    1. NO - Do you have a history of heart attack, stroke, stent, bypass or surgery on an artery in the head, neck, heart or legs?  2. NO - Do you ever have any pain or discomfort in your chest?  3. NO - Do you have a history of  Heart Failure?  4. NO - Are you troubled by shortness of breath when: walking on the level, up a slight hill or at night?  5. NO - Do you currently have a cold, bronchitis or other respiratory infection?  6. NO - Do you have a cough, shortness of breath or wheezing?  7. NO - Do you sometimes get pains in the calves of your legs when you walk?  8. NO - Do you or anyone in your family have previous history of blood clots?  9. NO - Do you or does anyone in your family have a serious bleeding problem such as prolonged bleeding following surgeries or cuts?  10. NO - Have you ever had problems with anemia or been told to take iron pills?  11. NO - Have you had any abnormal blood loss such as black, tarry or bloody stools, or abnormal vaginal bleeding?  12. YES - HAVE YOU EVER HAD A BLOOD TRANSFUSION? No complications   13. NO - Have you or any of your relatives ever had problems with anesthesia?  14.  NO - Do you have sleep apnea, excessive snoring or daytime drowsiness?  15. NO - Do you have any prosthetic heart valves?  16. NO - Do you have prosthetic joints?  17. NO - Is there any chance that you may be pregnant?      HPI:     HPI related to upcoming procedure: Madeline Person is a 29 year old female who presents with Spastic Quadriplegic Cerebral Palsy.     She is experiencing issues with her intrathecal Baclofen pump and has weaned off baclofen.  She is now planning to have the baclofen pump and catheter removed.       See problem list for active medical problems.  Problems all longstanding and stable, except as noted/documented.  See ROS for pertinent symptoms related to these conditions.    MEDICAL HISTORY:     Patient Active Problem List    Diagnosis Date Noted     [employer] Paperwork completed.  01/06/2020     Priority: Medium      Past Medical History:   Diagnosis Date     Cerebral palsy (H)      Spastic quadriplegia (H)      Past Surgical History:   Procedure Laterality Date     ------------OTHER-------------      Baclofen Pump Insertion     ------------OTHER-------------      bilateral femur osteotomy     EXAM UNDER ANESTHESIA DENTAL, RESTORATION N/A 1/8/2020    Procedure: Dental Exam, Radiographs, Periodontal Therapy, flouride varnish;  Surgeon: Blair Borges DDS;  [facility]     EXAM UNDER ANESTHESIA, RESTORATIONS, EXTRACTION(S) DENTAL COMPLEX, COMBINED N/A 4/9/2015    Procedure: COMBINED EXAM UNDER ANESTHESIA, RESTORATIONS, EXTRACTION(S) DENTAL COMPLEX;  Surgeon: Angelica Rubni DDS;  [facility] OR     FOOT SURGERY       ORTHOPEDIC SURGERY       Current Outpatient Medications   Medication Sig Dispense Refill     BACLOFEN IT Mom will bring info. From [employer] about baclofen pump infusion rate. Mom states  They are weaning it       biotin 2.5 MG TABS Take 2 chew tab by mouth daily       Bisacodyl (DULCOLAX RE) Place 1 suppository rectally daily as needed         cholecalciferol (VITAMIN D3) 5000 units (125 mcg) capsule Take 5,000 Units by mouth daily       Multiple Vitamins-Minerals (MULTIVITAMIN GUMMIES ADULT PO) Take 2 chew tab by mouth daily        OTC products: None, except as noted above    Allergies   Allergen Reactions     Latex      Other reaction(s): Unknown     Seasonal      Other reaction(s): Unknown      Latex Allergy: YES: Precautions to take: latex free environment    Social History     Tobacco Use     Smoking status: Never Smoker     Smokeless tobacco: Never Used   Substance Use Topics     Alcohol use: No     History   Drug Use No       REVIEW OF SYSTEMS:   CONSTITUTIONAL: NEGATIVE for fever, chills, change in weight  INTEGUMENTARY/SKIN: NEGATIVE for worrisome rashes, moles or lesions  ENT/MOUTH: NEGATIVE for ear, mouth and throat problems  RESP: NEGATIVE for significant cough or SOB  CV: NEGATIVE for chest pain, palpitations or peripheral edema  GI: NEGATIVE for nausea, abdominal pain, heartburn, or change in bowel habits  : NEGATIVE for frequency, dysuria, or hematuria  MUSCULOSKELETAL: spasticity, in wheelchair.  NEURO: spasticity; in wheelchair, weaned off Baclofen  HEME: NEGATIVE for bleeding problems  PSYCHIATRIC: NEGATIVE for changes in mood or affect    EXAM:   /62   Pulse 95   Temp 97.8  F (36.6  C) (Oral)   Resp 16   Wt 66.7 kg (147 lb)   SpO2 100%     GENERAL APPEARANCE: healthy, alert and no distress     EYES: EOMI, PERRL     NECK: no adenopathy, no asymmetry, masses, or scars and thyroid normal to palpation     RESP: lungs clear to auscultation - no rales, rhonchi or wheezes     CV: regular rates and rhythm, normal S1 S2, no S3 or S4      ABDOMEN:  soft, nontender, no HSM or masses and bowel sounds normal     MS: seated in wheelchair     SKIN: no suspicious lesions or rashes     NEURO: seated in wheelchair, nonambulatory      PSYCH: mentation appears normal. and affect normal/bright    DIAGNOSTICS:   UPT:   Results for orders placed  or performed in visit on 02/05/20   HCG Qual, Urine ([id number])     Status: None   Result Value Ref Range    HCG Qual Urine Negative NEG^Negative      Lab Results   Component Value Date    INR 1.07 01/22/2020     Lab Results   Component Value Date    HGB 13.2 01/22/2020          Recent Labs   Lab Test 01/22/20  0923 05/14/19  0736   HGB 13.2 13.6     --    INR 1.07  --    NA  --  138   POTASSIUM  --  4.6   CR  --  0.58        IMPRESSION:   Reason for surgery/procedure: no longer needing Baclofen pump  Diagnosis/reason for consult: Spastic quadriplegia     The proposed surgical procedure is considered INTERMEDIATE risk.    REVISED CARDIAC RISK INDEX  The patient has the following serious cardiovascular risks for perioperative complications such as (MI, PE, VFib and 3  AV Block):  No serious cardiac risks  INTERPRETATION: 0 risks: Class I (very low risk - 0.4% complication rate)    The patient has the following additional risks for perioperative complications:  No identified additional risks      ICD-10-CM    1. Preop general physical exam Z01.818 HCG Qual, Urine ([id number])   2. Spastic quadriplegic cerebral palsy (H) G80.0    3. Presence of intrathecal baclofen pump Z97.8        RECOMMENDATIONS:         (Z01.818) Preop general physical exam  (primary encounter diagnosis)  Comment: now plans 2 different procedures, replacement of intrathecal Baclofen pump 1/28/2020 and dental evaluation under anesthesia on 1/8/2020  Plan: INR, Partial thromboplastin time, CBC with         platelets and differential, HCG qualitative        As noted     (G80.0) Spastic quadriplegic cerebral palsy (H)  Comment: increased spasticity, requires manual wheelchair for mobility; aided by mother.  Plan: wheelchair, need Baclofen Pump.     (Z97.8) Presence of intrathecal baclofen pump  Comment: Baclofen pump catheter needs to be replaced and nearing end of battery life.  Plan: as noted         --Patient is on no chronic oral  medications.  --She Is advised to not take NSAIDS one week prior.   --Defer plans for Baclofen pump removal to surgeon.     APPROVAL GIVEN to proceed with proposed procedure, without further diagnostic evaluation         Signed Electronically by: MD Rosemary Vides MD  Internal Medicine  electronically signed     Copy of this evaluation report is provided to requesting physician.    [place] Preop Guidelines    Revised Cardiac Risk Index

## 2020-02-21 ENCOUNTER — TRANSFERRED RECORDS (OUTPATIENT)
Dept: HEALTH INFORMATION MANAGEMENT | Facility: CLINIC | Age: 30
End: 2020-02-21

## 2021-04-01 ENCOUNTER — OFFICE VISIT (OUTPATIENT)
Dept: FAMILY MEDICINE | Facility: CLINIC | Age: 31
End: 2021-04-01
Payer: COMMERCIAL

## 2021-04-01 VITALS
DIASTOLIC BLOOD PRESSURE: 68 MMHG | RESPIRATION RATE: 17 BRPM | OXYGEN SATURATION: 96 % | HEART RATE: 92 BPM | SYSTOLIC BLOOD PRESSURE: 118 MMHG | TEMPERATURE: 98 F | WEIGHT: 140.8 LBS

## 2021-04-01 DIAGNOSIS — G80.0 SPASTIC QUADRIPLEGIC CEREBRAL PALSY (H): ICD-10-CM

## 2021-04-01 DIAGNOSIS — Z00.00 ROUTINE GENERAL MEDICAL EXAMINATION AT A HEALTH CARE FACILITY: Primary | ICD-10-CM

## 2021-04-01 DIAGNOSIS — K59.01 SLOW TRANSIT CONSTIPATION: ICD-10-CM

## 2021-04-01 DIAGNOSIS — E78.5 HYPERLIPIDEMIA LDL GOAL <160: ICD-10-CM

## 2021-04-01 DIAGNOSIS — Z99.3 DEPENDENT ON WHEELCHAIR: ICD-10-CM

## 2021-04-01 PROCEDURE — 99395 PREV VISIT EST AGE 18-39: CPT | Performed by: INTERNAL MEDICINE

## 2021-04-01 RX ORDER — BISACODYL 10 MG
10 SUPPOSITORY, RECTAL RECTAL DAILY PRN
Qty: 30 SUPPOSITORY | Refills: 6 | Status: SHIPPED | OUTPATIENT
Start: 2021-04-01 | End: 2022-12-01

## 2021-04-01 ASSESSMENT — ENCOUNTER SYMPTOMS
HEMATOCHEZIA: 0
FREQUENCY: 0
DIARRHEA: 0
NAUSEA: 0
WEAKNESS: 0
DIZZINESS: 0
JOINT SWELLING: 0
EYE PAIN: 0
MYALGIAS: 0
SHORTNESS OF BREATH: 0
PALPITATIONS: 0
FEVER: 0
DYSURIA: 0
BREAST MASS: 0
PARESTHESIAS: 0
HEARTBURN: 0
HEMATURIA: 0
ARTHRALGIAS: 0
CONSTIPATION: 1
SORE THROAT: 0
NERVOUS/ANXIOUS: 0
ABDOMINAL PAIN: 0
COUGH: 0
HEADACHES: 0
CHILLS: 0

## 2021-04-01 NOTE — PROGRESS NOTES
Chief Complaint   Patient presents with     Physical        SUBJECTIVE:   CC: Madeline Person is an 31 year old woman who presents for preventive health visit.       Patient has been advised of split billing requirements and indicates understanding: Yes  Healthy Habits:     Getting at least 3 servings of Calcium per day:  Yes    Bi-annual eye exam:  Yes    Dental care twice a year:  NO    Sleep apnea or symptoms of sleep apnea:  None    Diet:  Regular (no restrictions)    Frequency of exercise:  2-3 days/week    Duration of exercise:  N/A    Taking medications regularly:  Not Applicable    Medication side effects:  Not applicable    PHQ-2 Total Score: 0    Additional concerns today:  Yes      Today's PHQ-2 Score:   PHQ-2 ( 1999 [employer]) 4/1/2021   Q1: Little interest or pleasure in doing things 0   Q2: Feeling down, depressed or hopeless 0   PHQ-2 Score 0   Q1: Little interest or pleasure in doing things Not at all   Q2: Feeling down, depressed or hopeless Not at all   PHQ-2 Score 0       Abuse: Current or Past (Physical, Sexual or Emotional) - No  Do you feel safe in your environment? Yes    Have you ever done Advance Care Planning? (For example, a Health Directive, POLST, or a discussion with a medical provider or your loved ones about your wishes): No, advance care planning information given to patient to review.  Patient declined advance care planning discussion at this time.    Social History     Tobacco Use     Smoking status: Never Smoker     Smokeless tobacco: Never Used   Substance Use Topics     Alcohol use: No         Alcohol Use 4/1/2021   Prescreen: >3 drinks/day or >7 drinks/week? Not Applicable       Reviewed orders with patient.  Reviewed health maintenance and updated orders accordingly - Yes  Labs reviewed in EPIC  BP Readings from Last 3 Encounters:   04/01/21 118/68   02/05/20 124/62   01/08/20 (!) 143/98    Wt Readings from Last 3 Encounters:   04/01/21 63.9 kg (140 lb 12.8 oz)   02/05/20  66.7 kg (147 lb)   01/08/20 66.9 kg (147 lb 7.8 oz)                  Patient Active Problem List   Diagnosis     [employer] Paperwork completed.      Past Surgical History:   Procedure Laterality Date     ------------OTHER-------------      Baclofen Pump Insertion     ------------OTHER-------------      bilateral femur osteotomy     EXAM UNDER ANESTHESIA DENTAL, RESTORATION N/A 1/8/2020    Procedure: Dental Exam, Radiographs, Periodontal Therapy, flouride varnish;  Surgeon: Blair Borges DDS;  [facility]     EXAM UNDER ANESTHESIA, RESTORATIONS, EXTRACTION(S) DENTAL COMPLEX, COMBINED N/A 4/9/2015    Procedure: COMBINED EXAM UNDER ANESTHESIA, RESTORATIONS, EXTRACTION(S) DENTAL COMPLEX;  Surgeon: Angelica Rubin DDS;  [facility]     FOOT SURGERY       ORTHOPEDIC SURGERY         Social History     Tobacco Use     Smoking status: Never Smoker     Smokeless tobacco: Never Used   Substance Use Topics     Alcohol use: No     History reviewed. No pertinent family history.      Current Outpatient Medications   Medication Sig Dispense Refill     biotin 2.5 MG TABS Take 2 chew tab by mouth daily       bisacodyl (DULCOLAX) 10 MG suppository Place 1 suppository (10 mg) rectally daily as needed for constipation 30 suppository 6     cholecalciferol (VITAMIN D3) 5000 units (125 mcg) capsule Take 5,000 Units by mouth daily       Multiple Vitamins-Minerals (MULTIVITAMIN GUMMIES ADULT PO) Take 2 chew tab by mouth daily        Allergies   Allergen Reactions     Latex      Other reaction(s): Unknown     Seasonal      Other reaction(s): Unknown     Recent Labs   Lab Test 05/14/19  0736   LDL 89   HDL 43*   TRIG 78   ALT 29   CR 0.58   GFRESTIMATED >90   GFRESTBLACK >90   POTASSIUM 4.6   TSH 1.78        Breast Cancer Screening:    FSH-7:   Breast CA Risk Assessment (FHS-7) 4/1/2021   Did any of your first-degree relatives have breast or ovarian cancer? No   Did any of your relatives have bilateral breast cancer?  No   Did any man in your family have breast cancer? No   Did any woman in your family have breast and ovarian cancer? Yes   Did any woman in your family have breast cancer before age 50 y? Yes   Do you have 2 or more relatives with breast and/or ovarian cancer? Yes   Do you have 2 or more relatives with breast and/or bowel cancer? Yes       Pertinent mammograms are reviewed under the imaging tab.    No results found for: PAP     Reviewed and updated as needed this visit by clinical staff  Tobacco  Allergies  Meds   Med Hx  Surg Hx  Fam Hx  Soc Hx        Reviewed and updated as needed this visit by Provider  Tobacco  Allergies  Meds  Problems  Med Hx  Surg Hx  Fam Hx         Past Medical History:   Diagnosis Date     Cerebral palsy (H)      Spastic quadriplegia (H)       Past Surgical History:   Procedure Laterality Date     ------------OTHER-------------      Baclofen Pump Insertion     ------------OTHER-------------      bilateral femur osteotomy     EXAM UNDER ANESTHESIA DENTAL, RESTORATION N/A 1/8/2020    Procedure: Dental Exam, Radiographs, Periodontal Therapy, flouride varnish;  Surgeon: Blair Borges DDS;  [facility]     EXAM UNDER ANESTHESIA, RESTORATIONS, EXTRACTION(S) DENTAL COMPLEX, COMBINED N/A 4/9/2015    Procedure: COMBINED EXAM UNDER ANESTHESIA, RESTORATIONS, EXTRACTION(S) DENTAL COMPLEX;  Surgeon: Angelica Rubin DDS;  [facility]     FOOT SURGERY       ORTHOPEDIC SURGERY         Review of Systems   Constitutional: Negative for chills and fever.   HENT: Negative for congestion, ear pain, hearing loss and sore throat.    Eyes: Negative for pain and visual disturbance.   Respiratory: Negative for cough and shortness of breath.    Cardiovascular: Negative for chest pain, palpitations and peripheral edema.   Gastrointestinal: Positive for constipation. Negative for abdominal pain, diarrhea, heartburn, hematochezia and nausea.   Breasts:  Negative for tenderness, breast  mass and discharge.   Genitourinary: Negative for dysuria, frequency, genital sores, hematuria, pelvic pain, urgency, vaginal bleeding and vaginal discharge.   Musculoskeletal: Negative for arthralgias, joint swelling and myalgias.   Skin: Negative for rash.   Neurological: Negative for dizziness, weakness, headaches and paresthesias.   Psychiatric/Behavioral: Negative for mood changes. The patient is not nervous/anxious.         OBJECTIVE:   /68   Pulse 92   Temp 98  F (36.7  C) (Oral)   Resp 17   Wt 63.9 kg (140 lb 12.8 oz)   LMP 03/15/2021 (Approximate)   SpO2 96%   Physical Exam  GENERAL: healthy, alert and no distress  EYES: Eyes grossly normal to inspection; wears glasses  HENT: ear canals- bilateral cerumen and TM's obscurred, nose and mouth without ulcers or lesions  NECK: no adenopathy, no asymmetry, masses, or scars and thyroid normal to palpation  RESP: lungs clear to auscultation - no rales, rhonchi or wheezes  CV: regular rate and rhythm, normal S1 S2, no S3 or S4, no murmur,  no peripheral edema and peripheral pulses strong  ABDOMEN: soft, nontender,  no masses and bowel sounds normal  MS: seated in wheelchair; Bilateral AFO; increased spasticity; no edema  NEURO: decreased trunk tone; spasticity; sensory exam grossly normal and mentation intact  PSYCH: mentation appears normal, affect normal/bright    Diagnostic Test Results:  Labs reviewed in Epic    ASSESSMENT/PLAN:     (Z00.00) Routine general medical examination at a health care facility  (primary encounter diagnosis)  Comment: HEALTH CARE MAINTENANCE reviewed  Plan:     (G80.0) Spastic quadriplegic cerebral palsy (H)  Comment: low trunk tone; she has a power wheel chair; she needs to add foot straps to improve stability and independent mobility  Plan:     (E78.5) Hyperlipidemia LDL goal <160  Comment:   Lab Results   Component Value Date    LDL 89 05/14/2019      at goal; keep active, healthy eating encouraged.  Plan: Comprehensive  metabolic panel, Lipid panel         reflex to direct LDL Fasting          (K59.01) Slow transit constipation  Comment: discussed adding fiber, fluids and keep as active as able.  She occasionally needs Dulcolax. Requests a refill; discussed adding Metamucil or Mirilax.  Plan: bisacodyl (DULCOLAX) 10 MG suppository    Wheelchair  low trunk tone; she has power wheel chair; she needs to add foot straps to improve stability and independent mobility            Patient has been advised of split billing requirements and indicates understanding: Yes  COUNSELING:  Reviewed preventive health counseling, as reflected in patient instructions       Regular exercise       Healthy diet/nutrition    There is no height or weight on file to calculate BMI. pt in wheelchair.       She reports that she has never smoked. She has never used smokeless tobacco.      Counseling Resources:  ATP IV Guidelines  Pooled Cohorts Equation Calculator  Breast Cancer Risk Calculator  BRCA-Related Cancer Risk Assessment: FHS-7 Tool  FRAX Risk Assessment  ICSI Preventive Guidelines  Dietary Guidelines for Americans, 2010  USDA's MyPlate  ASA Prophylaxis  Lung CA Screening    Rosemary Johnson MD  Internal Medicine   [facility]

## 2021-04-08 ENCOUNTER — DOCUMENTATION ONLY (OUTPATIENT)
Dept: LAB | Facility: CLINIC | Age: 31
End: 2021-04-08

## 2021-04-08 NOTE — PROGRESS NOTES
Madeline Person has an upcoming lab appointment:    Future Appointments   Date Time Provider Department Center   4/14/2021  8:45 AM RM LAB [facility]      Please review Health Maintenance and sign order: Review of Health Maintenance Protocol Orders (HMPO) to authorize patient's due Health Maintenance labs to be drawn.    Health Maintenance Due   Topic     ANNUAL REVIEW OF HM ORDERS      HIV SCREENING      HEPATITIS C SCREENING      Nelli Ballesteros

## 2021-04-09 ENCOUNTER — TELEPHONE (OUTPATIENT)
Dept: FAMILY MEDICINE | Facility: CLINIC | Age: 31
End: 2021-04-09

## 2021-04-09 NOTE — TELEPHONE ENCOUNTER
Tonya from  [employer] calling.     Patient needs Documentation for power wheel chair in order to do any repairs for it. The old notes that were faxed over is not enough.     [facility] needs a Letter of medical necessity  for her power wheel chair- will forward to Dr. Johnson     FAX to [phone] when this has been completed.   Phone [phone]     Mayelin Garcia RN on 4/9/2021 at 8:49 AM

## 2021-04-14 DIAGNOSIS — E78.5 HYPERLIPIDEMIA LDL GOAL <160: ICD-10-CM

## 2021-04-14 LAB
ALBUMIN SERPL-MCNC: 3.9 G/DL (ref 3.4–5)
ALP SERPL-CCNC: 64 U/L (ref 40–150)
ALT SERPL W P-5'-P-CCNC: 24 U/L (ref 0–50)
ANION GAP SERPL CALCULATED.3IONS-SCNC: 2 MMOL/L (ref 3–14)
AST SERPL W P-5'-P-CCNC: 9 U/L (ref 0–45)
BILIRUB SERPL-MCNC: 0.2 MG/DL (ref 0.2–1.3)
BUN SERPL-MCNC: 22 MG/DL (ref 7–30)
CALCIUM SERPL-MCNC: 9.4 MG/DL (ref 8.5–10.1)
CHLORIDE SERPL-SCNC: 109 MMOL/L (ref 94–109)
CHOLEST SERPL-MCNC: 172 MG/DL
CO2 SERPL-SCNC: 28 MMOL/L (ref 20–32)
CREAT SERPL-MCNC: 0.6 MG/DL (ref 0.52–1.04)
GFR SERPL CREATININE-BSD FRML MDRD: >90 ML/MIN/{1.73_M2}
GLUCOSE SERPL-MCNC: 83 MG/DL (ref 70–99)
HDLC SERPL-MCNC: 48 MG/DL
LDLC SERPL CALC-MCNC: 107 MG/DL
NONHDLC SERPL-MCNC: 124 MG/DL
POTASSIUM SERPL-SCNC: 4.2 MMOL/L (ref 3.4–5.3)
PROT SERPL-MCNC: 8 G/DL (ref 6.8–8.8)
SODIUM SERPL-SCNC: 139 MMOL/L (ref 133–144)
TRIGL SERPL-MCNC: 86 MG/DL

## 2021-04-14 PROCEDURE — 80053 COMPREHEN METABOLIC PANEL: CPT | Performed by: INTERNAL MEDICINE

## 2021-04-14 PROCEDURE — 80061 LIPID PANEL: CPT | Performed by: INTERNAL MEDICINE

## 2021-04-14 PROCEDURE — 36415 COLL VENOUS BLD VENIPUNCTURE: CPT | Performed by: INTERNAL MEDICINE

## 2021-08-10 ENCOUNTER — TRANSFERRED RECORDS (OUTPATIENT)
Dept: HEALTH INFORMATION MANAGEMENT | Facility: CLINIC | Age: 31
End: 2021-08-10

## 2021-09-16 ENCOUNTER — MEDICAL CORRESPONDENCE (OUTPATIENT)
Dept: HEALTH INFORMATION MANAGEMENT | Facility: CLINIC | Age: 31
End: 2021-09-16
Payer: COMMERCIAL

## 2021-09-24 ENCOUNTER — TELEPHONE (OUTPATIENT)
Dept: FAMILY MEDICINE | Facility: CLINIC | Age: 31
End: 2021-09-24

## 2021-09-24 NOTE — TELEPHONE ENCOUNTER
Reason for Call:  Other letter of medical nec    Detailed comments: [facility] [employer] states they need a letter of med nec to repair the manual wheelchair the patient has, the documentation they got was for the electric one.     Fax [phone]    Phone Number Patient can be reached at: Other phone number: [phone]    Best Time: na    Can we leave a detailed message on this number? YES    Call taken on 9/24/2021 at 2:28 PM by Shiresa H. Ormond

## 2021-09-24 NOTE — LETTER
10/5/2021      Madeline Person  [address] [facility] [place] [address]    : 1990        To whom it may concern    This is to certify that the following a Power wheelchair and manual wheel chair       Madeline has a permanent disability.  Diagnosis of cerebral palsy   Patient requires both a manual  chair and a power chair.  She is non ambulatory     Pt needs to have repairs on her current manual chair. It needs to be maintained in good working order for safety and mobility.  Needs both chairs to maintain upper extremity strength for short distances.  Needs power chair for longer distances .     is medically necessary and appropiate for the above patient - Madeline Person .        Please contact this office if further information is needed.      Sincerely,    Rosemary Johnson MD      [facility]  [address] [facility] [address] [phone]

## 2021-09-27 NOTE — TELEPHONE ENCOUNTER
Need clarification from pt and her mother.   Information has been provided but appears we do not have enough information.  Since formal documentation is needed, may need to set up video visit.

## 2021-09-30 NOTE — TELEPHONE ENCOUNTER
Letter just needs to state she has a permanent disability.  Diagnosis of cerebral palsy   Patient requires both a manual a chair and a power chair.  She is non ambulatory    Need letter for repairs on her current manual chair.  Needs both chairs to maintain upper extremity strength for short distances.  Needs power chair for longer distances .   If need any additional info please talk to Tonya at [employer]     Please fax to Tonya [phone] or fax number [phone]      Camila Salgado

## 2021-10-04 ENCOUNTER — MEDICAL CORRESPONDENCE (OUTPATIENT)
Dept: HEALTH INFORMATION MANAGEMENT | Facility: CLINIC | Age: 31
End: 2021-10-04
Payer: COMMERCIAL

## 2021-10-04 NOTE — TELEPHONE ENCOUNTER
Tonya from [facility] ([phone]) calls to again request Contin[employer] Care needs for medical necessity for the manual or both chairs.

## 2021-10-06 ENCOUNTER — TELEPHONE (OUTPATIENT)
Dept: FAMILY MEDICINE | Facility: CLINIC | Age: 31
End: 2021-10-06

## 2021-10-06 NOTE — TELEPHONE ENCOUNTER
Rec'd Detailed Prescription from [facility] for wheelchair repairs. Placed in PCP basket for review and signature. Fax: [phone]    Hilda Paris-

## 2021-10-07 NOTE — TELEPHONE ENCOUNTER
Tonya from [employer] [employer] calls to ask for forms to be expedited and returned.  Patient is waiting on wheelchair.

## 2021-10-18 NOTE — TELEPHONE ENCOUNTER
Spoke to Dr. Johnson & MA, information was faxed on 10/14/21. Refaxed, documents are in the [employer] Fax basket in case we get a return call.

## 2021-10-20 ENCOUNTER — TELEPHONE (OUTPATIENT)
Dept: FAMILY MEDICINE | Facility: CLINIC | Age: 31
End: 2021-10-20

## 2021-10-20 ENCOUNTER — MEDICAL CORRESPONDENCE (OUTPATIENT)
Dept: HEALTH INFORMATION MANAGEMENT | Facility: CLINIC | Age: 31
End: 2021-10-20
Payer: COMMERCIAL

## 2021-10-29 ENCOUNTER — TELEPHONE (OUTPATIENT)
Dept: FAMILY MEDICINE | Facility: CLINIC | Age: 31
End: 2021-10-29

## 2021-10-29 NOTE — TELEPHONE ENCOUNTER
Patients Mom Dahlia poole stating patient did a in home Covid-19 test today and it came back POSITIVE.  She states that 8 days ago she tested positive herself.  Patient did receive her Pfizer Covid Vaccines.  Patient has Cerebral Palsy.  Patient has increase sinus congestion, slight cough and low grade temp.  No SOB or wheezing.  Patient is not in any distress.  Patient is not on any medications currently.  Mom has been giving her Mucinex through a straw.    Consulted with Terri Manuel.  Advised to continue monitor.  Use vaporizer, honey, tylenol and comfort cares as needed.  Increase fluid intake.  Can log into [facility] for monoclonal antibodies.If no improvement or worsening advised to follow up.      Robyn Martinez RN

## 2021-11-09 ENCOUNTER — TRANSFERRED RECORDS (OUTPATIENT)
Dept: HEALTH INFORMATION MANAGEMENT | Facility: CLINIC | Age: 31
End: 2021-11-09
Payer: COMMERCIAL

## 2022-11-30 ENCOUNTER — TELEPHONE (OUTPATIENT)
Dept: FAMILY MEDICINE | Facility: CLINIC | Age: 32
End: 2022-11-30

## 2022-11-30 NOTE — TELEPHONE ENCOUNTER
Rec'd Letter Request from [facility]. Placed in PCP basket for review and signature. Fax [phone]    Lizy Wray

## 2022-12-01 ENCOUNTER — OFFICE VISIT (OUTPATIENT)
Dept: FAMILY MEDICINE | Facility: CLINIC | Age: 32
End: 2022-12-01
Payer: COMMERCIAL

## 2022-12-01 VITALS
WEIGHT: 158 LBS | DIASTOLIC BLOOD PRESSURE: 68 MMHG | OXYGEN SATURATION: 99 % | SYSTOLIC BLOOD PRESSURE: 120 MMHG | HEART RATE: 105 BPM | RESPIRATION RATE: 17 BRPM

## 2022-12-01 DIAGNOSIS — G80.0 SPASTIC QUADRIPLEGIC CEREBRAL PALSY (H): ICD-10-CM

## 2022-12-01 DIAGNOSIS — Z99.3 DEPENDENT ON WHEELCHAIR: ICD-10-CM

## 2022-12-01 DIAGNOSIS — Z00.00 ROUTINE GENERAL MEDICAL EXAMINATION AT A HEALTH CARE FACILITY: Primary | ICD-10-CM

## 2022-12-01 DIAGNOSIS — K59.01 SLOW TRANSIT CONSTIPATION: ICD-10-CM

## 2022-12-01 PROCEDURE — 99395 PREV VISIT EST AGE 18-39: CPT | Performed by: INTERNAL MEDICINE

## 2022-12-01 RX ORDER — BISACODYL 10 MG
10 SUPPOSITORY, RECTAL RECTAL DAILY PRN
Qty: 30 SUPPOSITORY | Refills: 6 | Status: SHIPPED | OUTPATIENT
Start: 2022-12-01

## 2022-12-01 RX ORDER — GUAR GUM
1 PACKET (EA) ORAL DAILY
COMMUNITY

## 2022-12-01 ASSESSMENT — ENCOUNTER SYMPTOMS
CHILLS: 0
COUGH: 0
NAUSEA: 0
NERVOUS/ANXIOUS: 0
PALPITATIONS: 0
HEMATOCHEZIA: 0
PARESTHESIAS: 0
DYSURIA: 0
HEARTBURN: 0
FEVER: 0
BREAST MASS: 0
DIARRHEA: 0
DIZZINESS: 0
JOINT SWELLING: 0
SHORTNESS OF BREATH: 0
EYE PAIN: 0
CONSTIPATION: 0
FREQUENCY: 0
ARTHRALGIAS: 0
WEAKNESS: 0
HEADACHES: 0
ABDOMINAL PAIN: 0
SORE THROAT: 0
HEMATURIA: 0
MYALGIAS: 0

## 2022-12-01 ASSESSMENT — PAIN SCALES - GENERAL: PAINLEVEL: NO PAIN (0)

## 2022-12-01 NOTE — Clinical Note
[facility]- Continued need for daily use of power and manual wheelchair for mobilibty due to permanent disability Plan: will fax request to [facility] at  [phone]

## 2022-12-01 NOTE — PROGRESS NOTES
Chief Complaint   Patient presents with     Physical     Consult     Has a power wheel chair and the fender is broken and is working with [employer] for repairs.   They should be able to fax the information for signature         SUBJECTIVE:   CC: Leonard is an 32 year old who presents for preventive health visit.     Patient has been advised of split billing requirements and indicates understanding: Yes    Mother expresses understanding   Healthy Habits:     Getting at least 3 servings of Calcium per day:  Yes    Bi-annual eye exam:  Yes    Dental care twice a year:  Yes    Sleep apnea or symptoms of sleep apnea:  None    Diet:  Regular (no restrictions)    Frequency of exercise:  None    Taking medications regularly:  Not Applicable    Medication side effects:  Not applicable    PHQ-2 Total Score: 0    Additional concerns today:  Yes        Today's PHQ-2 Score:   PHQ-2 ( 1999 [employer]) 12/1/2022   Q1: Little interest or pleasure in doing things 0   Q2: Feeling down, depressed or hopeless 0   PHQ-2 Score 0   PHQ-2 Total Score (12-17 Years)- Positive if 3 or more points; Administer PHQ-A if positive -   Q1: Little interest or pleasure in doing things Not at all   Q2: Feeling down, depressed or hopeless Not at all   PHQ-2 Score 0           Social History     Tobacco Use     Smoking status: Never     Smokeless tobacco: Never   Substance Use Topics     Alcohol use: No         Alcohol Use 12/1/2022   Prescreen: >3 drinks/day or >7 drinks/week? Not Applicable       Reviewed orders with patient.  Reviewed health maintenance and updated orders accordingly - Yes  Labs reviewed in EPIC  BP Readings from Last 3 Encounters:   12/01/22 120/68   04/01/21 118/68   02/05/20 124/62    Wt Readings from Last 3 Encounters:   12/01/22 71.7 kg (158 lb)   04/01/21 63.9 kg (140 lb 12.8 oz)   02/05/20 66.7 kg (147 lb)                  Patient Active Problem List   Diagnosis     [employer] Paperwork completed.      Past Surgical  History:   Procedure Laterality Date     ------------OTHER-------------      Baclofen Pump Insertion     ------------OTHER-------------      bilateral femur osteotomy     EXAM UNDER ANESTHESIA DENTAL, RESTORATION N/A 1/8/2020    Procedure: Dental Exam, Radiographs, Periodontal Therapy, flouride varnish;  Surgeon: Blair Borges DDS;  [facility]     EXAM UNDER ANESTHESIA, RESTORATIONS, EXTRACTION(S) DENTAL COMPLEX, COMBINED N/A 4/9/2015    Procedure: COMBINED EXAM UNDER ANESTHESIA, RESTORATIONS, EXTRACTION(S) DENTAL COMPLEX;  Surgeon: Angeliac Rubin DDS;  [facility]     FOOT SURGERY       ORTHOPEDIC SURGERY         Social History     Tobacco Use     Smoking status: Never     Smokeless tobacco: Never   Substance Use Topics     Alcohol use: No     No family history on file.      Current Outpatient Medications   Medication Sig Dispense Refill     biotin 2.5 MG TABS Take 2 chew tab by mouth daily       bisacodyl (DULCOLAX) 10 MG suppository Place 1 suppository (10 mg) rectally daily as needed for constipation 30 suppository 6     cholecalciferol (VITAMIN D3) 5000 units (125 mcg) capsule Take 5,000 Units by mouth daily       Guar Gum (FIBER MODULAR, NUTRISOURCE FIBER,) packet Take 1 packet by mouth daily       Multiple Vitamins-Minerals (MULTIVITAMIN GUMMIES ADULT PO) Take 2 chew tab by mouth daily        Allergies   Allergen Reactions     Latex      Other reaction(s): Unknown     Seasonal      Other reaction(s): Unknown     Recent Labs   Lab Test 04/14/21  0840 05/14/19  0736   * 89   HDL 48* 43*   TRIG 86 78   ALT 24 29   CR 0.60 0.58   GFRESTIMATED >90 >90   GFRESTBLACK >90 >90   POTASSIUM 4.2 4.6   TSH  --  1.78        Breast Cancer Screening:    FHS-7:   Breast CA Risk Assessment (FHS-7) 4/1/2021 12/1/2022   Did any of your first-degree relatives have breast or ovarian cancer? No No   Did any of your relatives have bilateral breast cancer? No No   Did any man in your family have breast  cancer? No No   Did any woman in your family have breast and ovarian cancer? Yes Yes   Did any woman in your family have breast cancer before age 50 y? Yes Yes   Do you have 2 or more relatives with breast and/or ovarian cancer? Yes Yes   Do you have 2 or more relatives with breast and/or bowel cancer? Yes No     click delete button to remove this line now    Pertinent mammograms are reviewed under the imaging tab.       Reviewed and updated as needed this visit by clinical staff   Tobacco  Allergies  Meds  Problems  Med Hx  Surg Hx  Fam Hx          Reviewed and updated as needed this visit by Provider   Tobacco  Allergies  Meds  Problems  Med Hx  Surg Hx  Fam Hx         Past Medical History:   Diagnosis Date     Cerebral palsy (H)      Spastic quadriplegia (H)       Past Surgical History:   Procedure Laterality Date     ------------OTHER-------------      Baclofen Pump Insertion     ------------OTHER-------------      bilateral femur osteotomy     EXAM UNDER ANESTHESIA DENTAL, RESTORATION N/A 1/8/2020    Procedure: Dental Exam, Radiographs, Periodontal Therapy, flouride varnish;  Surgeon: Blair Borges DDS;  [facility]     EXAM UNDER ANESTHESIA, RESTORATIONS, EXTRACTION(S) DENTAL COMPLEX, COMBINED N/A 4/9/2015    Procedure: COMBINED EXAM UNDER ANESTHESIA, RESTORATIONS, EXTRACTION(S) DENTAL COMPLEX;  Surgeon: Angelica Rubin DDS;  [facility]     FOOT SURGERY       ORTHOPEDIC SURGERY         Review of Systems   Constitutional: Negative for chills and fever.   HENT: Negative for congestion, ear pain, hearing loss and sore throat.    Eyes: Negative for pain and visual disturbance.   Respiratory: Negative for cough and shortness of breath.    Cardiovascular: Negative for chest pain, palpitations and peripheral edema.   Gastrointestinal: Negative for abdominal pain, constipation, diarrhea, heartburn, hematochezia and nausea.   Breasts:  Negative for tenderness, breast mass and  discharge.   Genitourinary: Negative for dysuria, frequency, genital sores, hematuria, pelvic pain, urgency, vaginal bleeding and vaginal discharge.   Musculoskeletal: Negative for arthralgias, joint swelling and myalgias.   Skin: Negative for rash.   Neurological: Negative for dizziness, weakness, headaches and paresthesias.   Psychiatric/Behavioral: Negative for mood changes. The patient is not nervous/anxious.           OBJECTIVE:   /68   Pulse 105   Resp 17   Wt 71.7 kg (158 lb)   SpO2 99%   Physical Exam  GENERAL: healthy, alert, no distress; accompanied by mother; mother helps with wheel chair transfers  EYES: wears glasses  NECK: no adenopathy, no asymmetry, masses, or scars and thyroid normal to palpation  RESP: lungs clear to auscultation - no rales, rhonchi or wheezes  CV: regular rate and rhythm, normal S1 S2, , no peripheral edema and peripheral pulses strong  ABDOMEN: soft, nontender and bowel sounds normal  MS: increased muscle tone  In all extremities associated with Cerebral Palsy. Using manual wheelchair today  NEURO: Cerebral Palsy  PSYCH: mentation appears normal and affect normal/bright    Diagnostic Test Results:  Labs reviewed in Epic          ASSESSMENT/PLAN:   (Z00.00) Routine general medical examination at a health care facility  (primary encounter diagnosis)  Comment: HEALTH CARE MAINTENANCE   Plan: reviewed previous labs and immunizations.    (K59.01) Slow transit constipation  Comment: BID Benefiber, fluids and keep as active as able.  She occasionally needs Dulcolax. Requests a refill  Plan: bisacodyl (DULCOLAX) 10 MG suppository        Benefiber BID encouraged; currently once daily.    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 She has never used smokeless tobacco.      Rosemary Johnson MD  Internal Medicine   [facility]

## 2023-03-24 ENCOUNTER — TELEPHONE (OUTPATIENT)
Dept: FAMILY MEDICINE | Facility: CLINIC | Age: 33
End: 2023-03-24
Payer: COMMERCIAL

## 2023-03-24 NOTE — TELEPHONE ENCOUNTER
Rec'd Incontinence Supplies Order form from [employer] Placed in PCP basket for review and signature. Fax [phone].    Lucretia Freeman

## 2023-04-25 ENCOUNTER — TELEPHONE (OUTPATIENT)
Dept: FAMILY MEDICINE | Facility: CLINIC | Age: 33
End: 2023-04-25
Payer: COMMERCIAL

## 2023-04-25 NOTE — TELEPHONE ENCOUNTER
Dahlia, pts mother and guardian calls.    Pt needs her wisdom teeth removed.  She has cerebral palsy.  It is hard for her to cooperated with x-rays.  She is wondering if we have an oral surgeon that she can use through [facility].  She will have to probably go under general anesthesia.    Asked if they have checked with the dentist.  The dentist didn't have anyone to recommend.    Advised to contact her insurance.  Advised I will ask Dr. Johnson also, but not sure she will know since this is more dental.

## 2023-04-25 NOTE — TELEPHONE ENCOUNTER
Spoke to Dr. Johnson.  She didn't really know of anyone specific.  She advised mom could check out [facility] - they have offices in [place], [place]. [place] per website.   This is the number for the [place] office - [phone].  Dr. Johnson was not sure if they would put her under general anesthesia as they already kind of put you out.  She advised calling and explaining the situation to see if this is something they can do and check insurance.

## 2023-05-09 ENCOUNTER — TRANSFERRED RECORDS (OUTPATIENT)
Dept: HEALTH INFORMATION MANAGEMENT | Facility: CLINIC | Age: 33
End: 2023-05-09
Payer: COMMERCIAL

## 2023-05-11 ENCOUNTER — OFFICE VISIT (OUTPATIENT)
Dept: FAMILY MEDICINE | Facility: CLINIC | Age: 33
End: 2023-05-11
Payer: COMMERCIAL

## 2023-05-11 VITALS
HEART RATE: 99 BPM | DIASTOLIC BLOOD PRESSURE: 70 MMHG | RESPIRATION RATE: 17 BRPM | OXYGEN SATURATION: 100 % | SYSTOLIC BLOOD PRESSURE: 128 MMHG | WEIGHT: 151 LBS | TEMPERATURE: 98.2 F

## 2023-05-11 DIAGNOSIS — Z01.818 PREOP GENERAL PHYSICAL EXAM: Primary | ICD-10-CM

## 2023-05-11 DIAGNOSIS — G80.0 SPASTIC QUADRIPLEGIC CEREBRAL PALSY (H): ICD-10-CM

## 2023-05-11 DIAGNOSIS — G40.822 INFANTILE SPASMS (H): ICD-10-CM

## 2023-05-11 DIAGNOSIS — Z23 ENCOUNTER FOR IMMUNIZATION: ICD-10-CM

## 2023-05-11 PROCEDURE — 90715 TDAP VACCINE 7 YRS/> IM: CPT | Performed by: INTERNAL MEDICINE

## 2023-05-11 PROCEDURE — 99214 OFFICE O/P EST MOD 30 MIN: CPT | Mod: 25 | Performed by: INTERNAL MEDICINE

## 2023-05-11 PROCEDURE — 90471 IMMUNIZATION ADMIN: CPT | Performed by: INTERNAL MEDICINE

## 2023-05-11 RX ORDER — ASCORBIC ACID 100 MG
1 TABLET,CHEWABLE ORAL DAILY
COMMUNITY

## 2023-05-11 ASSESSMENT — PAIN SCALES - GENERAL: PAINLEVEL: NO PAIN (0)

## 2023-05-11 NOTE — PATIENT INSTRUCTIONS
For informational purposes only. Not to replace the advice of your health care provider. Copyright   2003,  [facility]. All rights reserved. Clinically reviewed by Arabella Kc MD. [employer] [id number] - REV .  Preparing for Your Surgery  Getting started  A nurse will call you to review your health history and instructions. They will give you an arrival time based on your scheduled surgery time. Please be ready to share:  Your doctor's clinic name and phone number  Your medical, surgical, and anesthesia history  A list of allergies and sensitivities  A list of medicines, including herbal treatments and over-the-counter drugs  Whether the patient has a legal guardian (ask how to send us the papers in advance)  Please tell us if you're pregnant--or if there's any chance you might be pregnant. Some surgeries may injure a fetus (unborn baby), so they require a pregnancy test. Surgeries that are safe for a fetus don't always need a test, and you can choose whether to have one.   If you have a child who's having surgery, please ask for a copy of Preparing for Your Child's Surgery.    Preparing for surgery  Within 10 to 30 days of surgery: Have a pre-op exam (sometimes called an H&P, or History and Physical). This can be done at a clinic or pre-operative center.  If you're having a , you may not need this exam. Talk to your care team.  At your pre-op exam, talk to your care team about all medicines you take. If you need to stop any medicines before surgery, ask when to start taking them again.  We do this for your safety. Many medicines can make you bleed too much during surgery. Some change how well surgery (anesthesia) drugs work.  Call your insurance company to let them know you're having surgery. (If you don't have insurance, call [phone].)  Call your clinic if there's any change in your health. This includes signs of a cold or flu (sore throat, runny nose, cough, rash, fever). It  also includes a scrape or scratch near the surgery site.  If you have questions on the day of surgery, call your hospital or surgery center.  Eating and drinking guidelines  For your safety: Unless your surgeon tells you otherwise, follow the guidelines below.  Eat and drink as usual until 8 hours before you arrive for surgery. After that, no food or milk.  Drink clear liquids until 2 hours before you arrive. These are liquids you can see through, like water, Gatorade, and Propel Water. They also include plain black coffee and tea (no cream or milk), candy, and breath mints. You can spit out gum when you arrive.  If you drink alcohol: Stop drinking it the night before surgery.  If your care team tells you to take medicine on the morning of surgery, it's okay to take it with a sip of water.  Preventing infection  Shower or bathe the night before and morning of your surgery. Follow the instructions your clinic gave you. (If no instructions, use regular soap.)  Don't shave or clip hair near your surgery site. We'll remove the hair if needed.  Don't smoke or vape the morning of surgery. You may chew nicotine gum up to 2 hours before surgery. A nicotine patch is okay.  Note: Some surgeries require you to completely quit smoking and nicotine. Check with your surgeon.  Your care team will make every effort to keep you safe from infection. We will:  Clean our hands often with soap and water (or an alcohol-based hand rub).  Clean the skin at your surgery site with a special soap that kills germs.  Give you a special gown to keep you warm. (Cold raises the risk of infection.)  Wear special hair covers, masks, gowns and gloves during surgery.  Give antibiotic medicine, if prescribed. Not all surgeries need antibiotics.  What to bring on the day of surgery  Photo ID and insurance card  Copy of your health care directive, if you have one  Glasses and hearing aids (bring cases)  You can't wear contacts during surgery  Inhaler and  eye drops, if you use them (tell us about these when you arrive)  CPAP machine or breathing device, if you use them  A few personal items, if spending the night  If you have . . .  A pacemaker, ICD (cardiac defibrillator) or other implant: Bring the ID card.  An implanted stimulator: Bring the remote control.  A legal guardian: Bring a copy of the certified (court-stamped) guardianship papers.  Please remove any jewelry, including body piercings. Leave jewelry and other valuables at home.  If you're going home the day of surgery  You must have a responsible adult drive you home. They should stay with you overnight as well.  If you don't have someone to stay with you, and you aren't safe to go home alone, we may keep you overnight. Insurance often won't pay for this.  After surgery  If it's hard to control your pain or you need more pain medicine, please call your surgeon's office.  Questions?   If you have any questions for your care team, list them here: _________________________________________________________________________________________________________________________________________________________________________ ____________________________________ ____________________________________ ____________________________________

## 2023-05-11 NOTE — PROGRESS NOTES
[facility]  [address] [address]  Phone: [phone]  Primary Provider: Scooby Woods  Pre-op Performing Provider: SCOOBY WOODS      PREOPERATIVE EVALUATION:  Today's date: 5/11/2023    Madeline Person is a 33 year old female who presents for a preoperative evaluation.      5/11/2023     3:33 PM   Additional Questions   Roomed by Carlie RIVAS LPN   Accompanied by mom     Surgical Information:  Surgery/Procedure: Bilateral dental exam, radiograph, dental restorations, dental extractions, pulpotomy, root canal therapy, biopsy, frenectomy, gingivectomy, alveoloplasty, periodontal therapy, fluoride varnish in the mouth  [facility]gery [facility]  Surgeon: Pawel Beckham DDS  Surgery Date: 5/18/2023  Time of Surgery: 7:30 AM  Where patient plans to recover: At home with family  Fax number for surgical facility: Note does not need to be faxed, will be available electronically in Epic.    Assessment & Plan     The proposed surgical procedure is considered LOW risk.    (Z01.818) Preop general physical exam  (primary encounter diagnosis)  Comment: dental surgery anticipated- needsgeneral anesthesia due to cerebral palsy and  Spasticity.   Plan:     (G80.0) Spastic quadriplegic cerebral palsy (H)  Comment: wheel chair; increased spasticity which can interfere with ability to limit movement; needs general anesthesia.  Plan:     (Z23) Encounter for immunization  Comment: update immunizations  Plan: TDAP VACCINE (Adacel, Boostrix)                     Risks and Recommendations:  The patient has the following additional risks and recommendations for perioperative complications:      Antiplatelet or Anticoagulation Medication Instructions:   - Patient is on no antiplatelet or anticoagulation medications.   - Bleeding risk is low for this procedure (e.g. dental, skin, cataract).    Additional Medication Instructions:  Patient is on no additional chronic  medications    RECOMMENDATION:  APPROVAL GIVEN to proceed with proposed procedure, without further diagnostic evaluation.      30 minutes spent by me on the date of the encounter doing chart review, history and exam, documentation and further activities per the note  Rosemary Johnson MD  Internal Medicine  electronically signed             Subjective     HPI related to upcoming procedure: Madeline Person is a 33 year old female who presents for preoperative evaluation prior to undergoing dental cleaning and further evaluation in preparation for removal of impacted wisdom teeth.           5/11/2023     3:23 PM   Preop Questions   1. Have you ever had a heart attack or stroke? No   2. Have you ever had surgery on your heart or blood vessels, such as a stent placement, a coronary artery bypass, or surgery on an artery in your head, neck, heart, or legs? No   3. Do you have chest pain with activity? No   4. Do you have a history of  heart failure? No   5. Do you currently have a cold, bronchitis or symptoms of other infection? No   6. Do you have a cough, shortness of breath, or wheezing? No   7. Do you or anyone in your family have previous history of blood clots? UNKNOWN - father had MI and induced coma with CHF   8. Do you or does anyone in your family have a serious bleeding problem such as prolonged bleeding following surgeries or cuts? No   9. Have you ever had problems with anemia or been told to take iron pills? No   10. Have you had any abnormal blood loss such as black, tarry or bloody stools, or abnormal vaginal bleeding? No   11. Have you ever had a blood transfusion? YES - as an infant ( Premature birth)   11a. Have you ever had a transfusion reaction? NONE   12. Are you willing to have a blood transfusion if it is medically needed before, during, or after your surgery? Yes   13. Have you or any of your relatives ever had problems with anesthesia? Personally, no   14. Do you have sleep apnea, excessive  snoring or daytime drowsiness? No   15. Do you have any artifical heart valves or other implanted medical devices like a pacemaker, defibrillator, or continuous glucose monitor? No   16. Do you have artificial joints? No   17. Are you allergic to latex? No   18. Is there any chance that you may be pregnant? No       Health Care Directive:  Patient does not have a Health Care Directive or Living will on file    Preoperative Review of :   reviewed - no record of controlled substances prescribed.      Review of Systems  CONSTITUTIONAL: NEGATIVE for fever, chills, change in weight  INTEGUMENTARY/SKIN: NEGATIVE for worrisome rashes, moles or lesions  EYES: NEGATIVE for vision changes or irritation and wears glasses  ENT/MOUTH: NEGATIVE for ear, mouth and throat problems  RESP: NEGATIVE for significant cough or SOB  CV: NEGATIVE for chest pain, palpitations or peripheral edema  GI: NEGATIVE for nausea, abdominal pain, heartburn, or change in bowel habits  : NEGATIVE for frequency, dysuria, or hematuria  MUSCULOSKELETAL: NEGATIVE for significant arthralgias or myalgia  NEURO: NEGATIVE for weakness, dizziness or paresthesias  ENDOCRINE: NEGATIVE for temperature intolerance, skin/hair changes  HEME: NEGATIVE for bleeding problems  PSYCHIATRIC: NEGATIVE for changes in mood or affect    Patient Active Problem List    Diagnosis Date Noted     [employer] Paperwork completed.  01/06/2020     Priority: Medium      Past Medical History:   Diagnosis Date     Cerebral palsy (H)      Spastic quadriplegia (H)      Past Surgical History:   Procedure Laterality Date     ------------OTHER-------------      Baclofen Pump Insertion     ------------OTHER-------------      bilateral femur osteotomy     EXAM UNDER ANESTHESIA DENTAL, RESTORATION N/A 1/8/2020    Procedure: Dental Exam, Radiographs, Periodontal Therapy, flouride varnish;  Surgeon: Blair Borges DDS;  [facility]     EXAM UNDER ANESTHESIA, RESTORATIONS,  EXTRACTION(S) DENTAL COMPLEX, COMBINED N/A 4/9/2015    Procedure: COMBINED EXAM UNDER ANESTHESIA, RESTORATIONS, EXTRACTION(S) DENTAL COMPLEX;  Surgeon: Angelica Rubin DDS;  [facility]     FOOT SURGERY       ORTHOPEDIC SURGERY       Current Outpatient Medications   Medication Sig Dispense Refill     Ascorbic Acid (VITAMIN C) 100 MG CHEW Take 1 chew tab by mouth daily       biotin 2.5 MG TABS Take 2 chew tab by mouth daily       bisacodyl (DULCOLAX) 10 MG suppository Place 1 suppository (10 mg) rectally daily as needed for constipation 30 suppository 6     cholecalciferol (VITAMIN D3) 5000 units (125 mcg) capsule Take 5,000 Units by mouth daily       Guar Gum (FIBER MODULAR, NUTRISOURCE FIBER,) packet Take 1 packet by mouth daily       Multiple Vitamins-Minerals (MULTIVITAMIN GUMMIES ADULT PO) Take 2 chew tab by mouth daily          Allergies   Allergen Reactions     Latex      Other reaction(s): Unknown     Seasonal      Other reaction(s): Unknown        Social History     Tobacco Use     Smoking status: Never     Smokeless tobacco: Never   Vaping Use     Vaping status: Not on file   Substance Use Topics     Alcohol use: No     No family history on file.  History   Drug Use No         Objective     /70   Pulse 99   Temp 98.2  F (36.8  C) (Oral)   Resp 17   Wt 68.5 kg (151 lb)   SpO2 100%     Physical Exam    GENERAL APPEARANCE: healthy, alert and no distress     HENT: no oral sores.     NECK: no adenopathy, no asymmetry, masses, or scars and thyroid normal to palpation     RESP: lungs clear to auscultation - no rales, rhonchi or wheezes     CV: regular rates and rhythm, normal S1 S2, no S3 or S4 and no murmur, click or rub     ABDOMEN:  soft, nontender, no HSM or masses and bowel sounds normal     MS: extremities normal- no gross deformities noted, no evidence of inflammation in joints, FROM in all extremities.     NEURO: Normal strength and tone, sensory exam grossly normal, mentation intact  and speech normal     PSYCH: mentation appears normal. and affect normal/bright    No results for input(s): HGB, PLT, INR, NA, POTASSIUM, CR, A1C in the last 62735 hours.     Diagnostics:  No labs were ordered during this visit.   No EKG required for low risk surgery (cataract, skin procedure, breast biopsy, etc).    Revised Cardiac Risk Index (RCRI):  The patient has the following serious cardiovascular risks for perioperative complications:   - No serious cardiac risks = 0 points     RCRI Interpretation: 0 points: Class I (very low risk - 0.4% complication rate)           Signed Electronically by: Rosemary Johnson MD  Copy of this evaluation report is provided to requesting physician.    Rosemary Johnson MD  Internal Medicine  electronically signed

## 2023-05-16 ENCOUNTER — DOCUMENTATION ONLY (OUTPATIENT)
Dept: OTHER | Facility: CLINIC | Age: 33
End: 2023-05-16
Payer: COMMERCIAL

## 2023-05-17 ENCOUNTER — ANESTHESIA EVENT (OUTPATIENT)
Dept: SURGERY | Facility: CLINIC | Age: 33
End: 2023-05-17
Payer: COMMERCIAL

## 2023-05-17 PROBLEM — G40.822 INFANTILE SPASMS (H): Status: ACTIVE | Noted: 2023-05-17

## 2023-05-17 RX ORDER — LIDOCAINE 40 MG/G
CREAM TOPICAL
Status: CANCELLED | OUTPATIENT
Start: 2023-05-17

## 2023-05-17 RX ORDER — SODIUM CHLORIDE, SODIUM LACTATE, POTASSIUM CHLORIDE, CALCIUM CHLORIDE 600; 310; 30; 20 MG/100ML; MG/100ML; MG/100ML; MG/100ML
INJECTION, SOLUTION INTRAVENOUS CONTINUOUS
Status: CANCELLED | OUTPATIENT
Start: 2023-05-17

## 2023-05-17 NOTE — ANESTHESIA PREPROCEDURE EVALUATION
Anesthesia Pre-Procedure Evaluation    Patient: Madeline Person   MRN: [id number] : 1990        Procedure : Procedure(s):  Bilateral dental exam, radiographs, dental restorations, dental extractions, pulpotomy, root canal therapy, biopsy, frenectomy, gingivectomy, alveoloplasty, periodontal therapy, fluoride varnish in the mouth          Past Medical History:   Diagnosis Date     Cerebral palsy (H)      Spastic quadriplegia (H)       Past Surgical History:   Procedure Laterality Date     ------------OTHER-------------      Baclofen Pump Insertion     ------------OTHER-------------      bilateral femur osteotomy     EXAM UNDER ANESTHESIA DENTAL, RESTORATION N/A 2020    Procedure: Dental Exam, Radiographs, Periodontal Therapy, flouride varnish;  Surgeon: Blair Borges DDS;  [facility]     EXAM UNDER ANESTHESIA, RESTORATIONS, EXTRACTION(S) DENTAL COMPLEX, COMBINED N/A 2015    Procedure: COMBINED EXAM UNDER ANESTHESIA, RESTORATIONS, EXTRACTION(S) DENTAL COMPLEX;  Surgeon: Angelica Rubin DDS;  Location: [facility] OR     FOOT SURGERY       ORTHOPEDIC SURGERY        Allergies   Allergen Reactions     Latex      Other reaction(s): Unknown     Seasonal      Other reaction(s): Unknown      Social History     Tobacco Use     Smoking status: Never     Smokeless tobacco: Never   Vaping Use     Vaping status: Not on file   Substance Use Topics     Alcohol use: No      Wt Readings from Last 1 Encounters:   23 68.5 kg (151 lb)        Anesthesia Evaluation            ROS/MED HX  ENT/Pulmonary:       Neurologic: Comment: Cerebral palsy with spastic quadriparesis   Intrathecal baclofen pump?      Cardiovascular:       METS/Exercise Tolerance:     Hematologic:       Musculoskeletal:       GI/Hepatic:       Renal/Genitourinary:       Endo:       Psychiatric/Substance Use:       Infectious Disease:       Malignancy:       Other:               OUTSIDE LABS:  CBC:   Lab Results   Component Value Date     WBC 6.7 01/22/2020    WBC 12.8 (H) 07/04/2010    HGB 13.2 01/22/2020    HGB 13.6 05/14/2019    HCT 39.8 01/22/2020    HCT 39.6 07/04/2010     01/22/2020     07/04/2010     BMP:   Lab Results   Component Value Date     04/14/2021     05/14/2019    POTASSIUM 4.2 04/14/2021    POTASSIUM 4.6 05/14/2019    CHLORIDE 109 04/14/2021    CHLORIDE 108 05/14/2019    CO2 28 04/14/2021    CO2 23 05/14/2019    BUN 22 04/14/2021    BUN 16 05/14/2019    CR 0.60 04/14/2021    CR 0.58 05/14/2019    GLC 83 04/14/2021    GLC 89 05/14/2019     COAGS:   Lab Results   Component Value Date    PTT 36 01/22/2020    INR 1.07 01/22/2020     POC:   Lab Results   Component Value Date    HCG Negative 02/05/2020    HCGS Negative 01/22/2020     HEPATIC:   Lab Results   Component Value Date    ALBUMIN 3.9 04/14/2021    PROTTOTAL 8.0 04/14/2021    ALT 24 04/14/2021    AST 9 04/14/2021    ALKPHOS 64 04/14/2021    BILITOTAL 0.2 04/14/2021     OTHER:   Lab Results   Component Value Date    GRANT 9.4 04/14/2021    TSH 1.78 05/14/2019       Anesthesia Plan    ASA Status:  3      Anesthesia Type: General.     - Airway: ETT   Induction: Intravenous, Propofol.   Maintenance: Balanced.   Techniques and Equipment:     - Airway: Nasal IZABELLA, Fiberoptic Bronchoscope     - Lines/Monitors: BIS     Consents            Postoperative Care    Pain management: IV analgesics, Oral pain medications, Multi-modal analgesia.   PONV prophylaxis: Ondansetron (or other 5HT-3), Dexamethasone or Solumedrol     Comments:                Karlo Chauhan MD

## 2023-05-18 ENCOUNTER — HOSPITAL ENCOUNTER (OUTPATIENT)
Facility: CLINIC | Age: 33
Discharge: HOME | End: 2023-05-18
Attending: DENTIST | Admitting: DENTIST
Payer: COMMERCIAL

## 2023-05-18 ENCOUNTER — ANESTHESIA (OUTPATIENT)
Dept: SURGERY | Facility: CLINIC | Age: 33
End: 2023-05-18
Payer: COMMERCIAL

## 2023-05-18 VITALS
DIASTOLIC BLOOD PRESSURE: 88 MMHG | HEIGHT: 60 IN | HEART RATE: 117 BPM | OXYGEN SATURATION: 96 % | TEMPERATURE: 98.6 F | WEIGHT: 151.68 LBS | BODY MASS INDEX: 29.78 KG/M2 | SYSTOLIC BLOOD PRESSURE: 133 MMHG | RESPIRATION RATE: 16 BRPM

## 2023-05-18 DIAGNOSIS — K00.6 TOOTH ERUPTION DISTURBANCE: Primary | ICD-10-CM

## 2023-05-18 PROCEDURE — 710N000012 HC RECOVERY PHASE 2, PER MINUTE: Performed by: DENTIST

## 2023-05-18 PROCEDURE — 999N000141 HC STATISTIC PRE-PROCEDURE NURSING ASSESSMENT: Performed by: DENTIST

## 2023-05-18 PROCEDURE — 710N000010 HC RECOVERY PHASE 1, LEVEL 2, PER MIN: Performed by: DENTIST

## 2023-05-18 PROCEDURE — 250N000013 HC RX MED GY IP 250 OP 250 PS 637: Performed by: DENTIST

## 2023-05-18 PROCEDURE — 272N000001 HC OR GENERAL SUPPLY STERILE: Performed by: DENTIST

## 2023-05-18 PROCEDURE — 250N000009 HC RX 250: Performed by: STUDENT IN AN ORGANIZED HEALTH CARE EDUCATION/TRAINING PROGRAM

## 2023-05-18 PROCEDURE — 250N000025 HC SEVOFLURANE, PER MIN: Performed by: DENTIST

## 2023-05-18 PROCEDURE — 250N000009 HC RX 250: Performed by: DENTIST

## 2023-05-18 PROCEDURE — 370N000017 HC ANESTHESIA TECHNICAL FEE, PER MIN: Performed by: DENTIST

## 2023-05-18 PROCEDURE — 360N000075 HC SURGERY LEVEL 2, PER MIN: Performed by: DENTIST

## 2023-05-18 PROCEDURE — 258N000003 HC RX IP 258 OP 636: Performed by: STUDENT IN AN ORGANIZED HEALTH CARE EDUCATION/TRAINING PROGRAM

## 2023-05-18 PROCEDURE — 250N000011 HC RX IP 250 OP 636: Performed by: STUDENT IN AN ORGANIZED HEALTH CARE EDUCATION/TRAINING PROGRAM

## 2023-05-18 RX ORDER — FENTANYL CITRATE 50 UG/ML
25 INJECTION, SOLUTION INTRAMUSCULAR; INTRAVENOUS EVERY 5 MIN PRN
Status: DISCONTINUED | OUTPATIENT
Start: 2023-05-18 | End: 2023-05-18 | Stop reason: HOSPADM

## 2023-05-18 RX ORDER — ONDANSETRON 2 MG/ML
4 INJECTION INTRAMUSCULAR; INTRAVENOUS EVERY 30 MIN PRN
Status: DISCONTINUED | OUTPATIENT
Start: 2023-05-18 | End: 2023-05-18

## 2023-05-18 RX ORDER — FENTANYL CITRATE 50 UG/ML
50 INJECTION, SOLUTION INTRAMUSCULAR; INTRAVENOUS EVERY 5 MIN PRN
Status: DISCONTINUED | OUTPATIENT
Start: 2023-05-18 | End: 2023-05-18 | Stop reason: HOSPADM

## 2023-05-18 RX ORDER — CHLORHEXIDINE GLUCONATE ORAL RINSE 1.2 MG/ML
SOLUTION DENTAL PRN
Status: DISCONTINUED | OUTPATIENT
Start: 2023-05-18 | End: 2023-05-18 | Stop reason: HOSPADM

## 2023-05-18 RX ORDER — ONDANSETRON 2 MG/ML
4 INJECTION INTRAMUSCULAR; INTRAVENOUS EVERY 30 MIN PRN
Status: DISCONTINUED | OUTPATIENT
Start: 2023-05-18 | End: 2023-05-18 | Stop reason: HOSPADM

## 2023-05-18 RX ORDER — ACETAMINOPHEN 325 MG/1
975 TABLET ORAL ONCE
Status: DISCONTINUED | OUTPATIENT
Start: 2023-05-18 | End: 2023-05-18 | Stop reason: HOSPADM

## 2023-05-18 RX ORDER — SODIUM CHLORIDE, SODIUM LACTATE, POTASSIUM CHLORIDE, CALCIUM CHLORIDE 600; 310; 30; 20 MG/100ML; MG/100ML; MG/100ML; MG/100ML
INJECTION, SOLUTION INTRAVENOUS CONTINUOUS PRN
Status: DISCONTINUED | OUTPATIENT
Start: 2023-05-18 | End: 2023-05-18

## 2023-05-18 RX ORDER — NALOXONE HYDROCHLORIDE 0.4 MG/ML
0.4 INJECTION, SOLUTION INTRAMUSCULAR; INTRAVENOUS; SUBCUTANEOUS
Status: DISCONTINUED | OUTPATIENT
Start: 2023-05-18 | End: 2023-05-18 | Stop reason: HOSPADM

## 2023-05-18 RX ORDER — AMOXICILLIN 250 MG/5ML
500 POWDER, FOR SUSPENSION ORAL 2 TIMES DAILY
Qty: 140 ML | Refills: 0 | Status: SHIPPED | OUTPATIENT
Start: 2023-05-18 | End: 2023-05-25

## 2023-05-18 RX ORDER — IBUPROFEN 100 MG/5ML
600 SUSPENSION, ORAL (FINAL DOSE FORM) ORAL EVERY 6 HOURS PRN
Qty: 840 ML | Refills: 0 | Status: SHIPPED | OUTPATIENT
Start: 2023-05-18 | End: 2023-05-25

## 2023-05-18 RX ORDER — OXYCODONE HCL 5 MG/5 ML
2.5 SOLUTION, ORAL ORAL EVERY 6 HOURS PRN
Qty: 30 ML | Refills: 0 | Status: SHIPPED | OUTPATIENT
Start: 2023-05-18 | End: 2023-05-21

## 2023-05-18 RX ORDER — ACETAMINOPHEN 160 MG
TABLET,DISINTEGRATING ORAL PRN
Status: DISCONTINUED | OUTPATIENT
Start: 2023-05-18 | End: 2023-05-18 | Stop reason: HOSPADM

## 2023-05-18 RX ORDER — ONDANSETRON 4 MG/1
4 TABLET, FILM COATED ORAL EVERY 8 HOURS PRN
Qty: 12 TABLET | Refills: 0 | Status: SHIPPED | OUTPATIENT
Start: 2023-05-18 | End: 2024-01-09

## 2023-05-18 RX ORDER — ACETAMINOPHEN 160 MG/5ML
500 SUSPENSION ORAL EVERY 6 HOURS PRN
Qty: 437.5 ML | Refills: 0 | Status: SHIPPED | OUTPATIENT
Start: 2023-05-18 | End: 2023-05-25

## 2023-05-18 RX ORDER — ONDANSETRON 4 MG/1
4 TABLET, ORALLY DISINTEGRATING ORAL EVERY 30 MIN PRN
Status: DISCONTINUED | OUTPATIENT
Start: 2023-05-18 | End: 2023-05-18

## 2023-05-18 RX ORDER — LIDOCAINE 40 MG/G
CREAM TOPICAL
Status: DISCONTINUED | OUTPATIENT
Start: 2023-05-18 | End: 2023-05-18 | Stop reason: HOSPADM

## 2023-05-18 RX ORDER — ONDANSETRON 4 MG/1
4 TABLET, ORALLY DISINTEGRATING ORAL EVERY 30 MIN PRN
Status: DISCONTINUED | OUTPATIENT
Start: 2023-05-18 | End: 2023-05-18 | Stop reason: HOSPADM

## 2023-05-18 RX ORDER — HYDROMORPHONE HYDROCHLORIDE 1 MG/ML
0.2 INJECTION, SOLUTION INTRAMUSCULAR; INTRAVENOUS; SUBCUTANEOUS EVERY 5 MIN PRN
Status: DISCONTINUED | OUTPATIENT
Start: 2023-05-18 | End: 2023-05-18 | Stop reason: HOSPADM

## 2023-05-18 RX ORDER — BUPIVACAINE HYDROCHLORIDE AND EPINEPHRINE 5; 5 MG/ML; UG/ML
INJECTION, SOLUTION EPIDURAL; INTRACAUDAL; PERINEURAL PRN
Status: DISCONTINUED | OUTPATIENT
Start: 2023-05-18 | End: 2023-05-18 | Stop reason: HOSPADM

## 2023-05-18 RX ORDER — CEFAZOLIN SODIUM 1 G/3ML
INJECTION, POWDER, FOR SOLUTION INTRAMUSCULAR; INTRAVENOUS PRN
Status: DISCONTINUED | OUTPATIENT
Start: 2023-05-18 | End: 2023-05-18

## 2023-05-18 RX ORDER — POVIDONE-IODINE 10 MG/ML
SOLUTION TOPICAL PRN
Status: DISCONTINUED | OUTPATIENT
Start: 2023-05-18 | End: 2023-05-18 | Stop reason: HOSPADM

## 2023-05-18 RX ORDER — LABETALOL HYDROCHLORIDE 5 MG/ML
10 INJECTION, SOLUTION INTRAVENOUS
Status: DISCONTINUED | OUTPATIENT
Start: 2023-05-18 | End: 2023-05-18 | Stop reason: HOSPADM

## 2023-05-18 RX ORDER — KETOROLAC TROMETHAMINE 30 MG/ML
INJECTION, SOLUTION INTRAMUSCULAR; INTRAVENOUS PRN
Status: DISCONTINUED | OUTPATIENT
Start: 2023-05-18 | End: 2023-05-18

## 2023-05-18 RX ORDER — NALOXONE HYDROCHLORIDE 0.4 MG/ML
0.2 INJECTION, SOLUTION INTRAMUSCULAR; INTRAVENOUS; SUBCUTANEOUS
Status: DISCONTINUED | OUTPATIENT
Start: 2023-05-18 | End: 2023-05-18 | Stop reason: HOSPADM

## 2023-05-18 RX ORDER — LIDOCAINE HYDROCHLORIDE 20 MG/ML
INJECTION, SOLUTION INFILTRATION; PERINEURAL PRN
Status: DISCONTINUED | OUTPATIENT
Start: 2023-05-18 | End: 2023-05-18

## 2023-05-18 RX ORDER — PROPOFOL 10 MG/ML
INJECTION, EMULSION INTRAVENOUS PRN
Status: DISCONTINUED | OUTPATIENT
Start: 2023-05-18 | End: 2023-05-18

## 2023-05-18 RX ORDER — BACITRACIN ZINC 500 [USP'U]/G
OINTMENT TOPICAL PRN
Status: DISCONTINUED | OUTPATIENT
Start: 2023-05-18 | End: 2023-05-18 | Stop reason: HOSPADM

## 2023-05-18 RX ORDER — SODIUM CHLORIDE, SODIUM LACTATE, POTASSIUM CHLORIDE, CALCIUM CHLORIDE 600; 310; 30; 20 MG/100ML; MG/100ML; MG/100ML; MG/100ML
INJECTION, SOLUTION INTRAVENOUS CONTINUOUS
Status: DISCONTINUED | OUTPATIENT
Start: 2023-05-18 | End: 2023-05-18 | Stop reason: HOSPADM

## 2023-05-18 RX ORDER — DEXAMETHASONE SODIUM PHOSPHATE 4 MG/ML
INJECTION, SOLUTION INTRA-ARTICULAR; INTRALESIONAL; INTRAMUSCULAR; INTRAVENOUS; SOFT TISSUE PRN
Status: DISCONTINUED | OUTPATIENT
Start: 2023-05-18 | End: 2023-05-18

## 2023-05-18 RX ORDER — OXYCODONE HYDROCHLORIDE 10 MG/1
10 TABLET ORAL
Status: DISCONTINUED | OUTPATIENT
Start: 2023-05-18 | End: 2023-05-18 | Stop reason: HOSPADM

## 2023-05-18 RX ORDER — HYDROMORPHONE HYDROCHLORIDE 1 MG/ML
0.4 INJECTION, SOLUTION INTRAMUSCULAR; INTRAVENOUS; SUBCUTANEOUS EVERY 5 MIN PRN
Status: DISCONTINUED | OUTPATIENT
Start: 2023-05-18 | End: 2023-05-18 | Stop reason: HOSPADM

## 2023-05-18 RX ORDER — FENTANYL CITRATE 50 UG/ML
INJECTION, SOLUTION INTRAMUSCULAR; INTRAVENOUS PRN
Status: DISCONTINUED | OUTPATIENT
Start: 2023-05-18 | End: 2023-05-18

## 2023-05-18 RX ORDER — ONDANSETRON 2 MG/ML
INJECTION INTRAMUSCULAR; INTRAVENOUS PRN
Status: DISCONTINUED | OUTPATIENT
Start: 2023-05-18 | End: 2023-05-18

## 2023-05-18 RX ORDER — AMOXICILLIN 250 MG/5ML
50 POWDER, FOR SUSPENSION ORAL 2 TIMES DAILY
Qty: 476 ML | Refills: 0 | Status: SHIPPED | OUTPATIENT
Start: 2023-05-18 | End: 2023-05-18

## 2023-05-18 RX ORDER — OXYCODONE HYDROCHLORIDE 5 MG/1
5 TABLET ORAL
Status: DISCONTINUED | OUTPATIENT
Start: 2023-05-18 | End: 2023-05-18 | Stop reason: HOSPADM

## 2023-05-18 RX ADMIN — FENTANYL CITRATE 50 MCG: 50 INJECTION, SOLUTION INTRAMUSCULAR; INTRAVENOUS at 07:46

## 2023-05-18 RX ADMIN — DEXAMETHASONE SODIUM PHOSPHATE 4 MG: 4 INJECTION, SOLUTION INTRA-ARTICULAR; INTRALESIONAL; INTRAMUSCULAR; INTRAVENOUS; SOFT TISSUE at 07:46

## 2023-05-18 RX ADMIN — ONDANSETRON 4 MG: 2 INJECTION INTRAMUSCULAR; INTRAVENOUS at 09:06

## 2023-05-18 RX ADMIN — SUGAMMADEX 150 MG: 100 INJECTION, SOLUTION INTRAVENOUS at 09:36

## 2023-05-18 RX ADMIN — PHENYLEPHRINE HYDROCHLORIDE 100 MCG: 10 INJECTION INTRAVENOUS at 08:24

## 2023-05-18 RX ADMIN — CEFAZOLIN 2 G: 1 INJECTION, POWDER, FOR SOLUTION INTRAMUSCULAR; INTRAVENOUS at 08:30

## 2023-05-18 RX ADMIN — PROPOFOL 120 MG: 10 INJECTION, EMULSION INTRAVENOUS at 07:46

## 2023-05-18 RX ADMIN — MIDAZOLAM 2 MG: 1 INJECTION INTRAMUSCULAR; INTRAVENOUS at 07:37

## 2023-05-18 RX ADMIN — LIDOCAINE HYDROCHLORIDE 60 MG: 20 INJECTION, SOLUTION INFILTRATION; PERINEURAL at 07:46

## 2023-05-18 RX ADMIN — PROPOFOL 50 MG: 10 INJECTION, EMULSION INTRAVENOUS at 09:10

## 2023-05-18 RX ADMIN — KETOROLAC TROMETHAMINE 30 MG: 30 INJECTION, SOLUTION INTRAMUSCULAR at 09:10

## 2023-05-18 RX ADMIN — FENTANYL CITRATE 25 MCG: 50 INJECTION, SOLUTION INTRAMUSCULAR; INTRAVENOUS at 09:11

## 2023-05-18 RX ADMIN — SODIUM CHLORIDE, POTASSIUM CHLORIDE, SODIUM LACTATE AND CALCIUM CHLORIDE: 600; 310; 30; 20 INJECTION, SOLUTION INTRAVENOUS at 07:39

## 2023-05-18 RX ADMIN — Medication 40 MG: at 07:46

## 2023-05-18 ASSESSMENT — ACTIVITIES OF DAILY LIVING (ADL)
ADLS_ACUITY_SCORE: 43
ADLS_ACUITY_SCORE: 39
ADLS_ACUITY_SCORE: 43

## 2023-05-18 NOTE — ANESTHESIA CARE TRANSFER NOTE
Patient: Madeline Person    Procedure: Procedure(s):  Bilateral dental exam, radiographs, periodontal therapy, fluoride varnish in the mouth  Extraction of #17, 32       Diagnosis: Dental caries [K02.9]  Diagnosis Additional Information: No value filed.    Anesthesia Type:   General     Note:    Oropharynx: oropharynx clear of all foreign objects and spontaneously breathing  Level of Consciousness: drowsy  Oxygen Supplementation: face mask  Level of Supplemental Oxygen (L/min / FiO2): 6  Independent Airway: airway patency satisfactory and stable  Dentition: dentition unchanged  Vital Signs Stable: post-procedure vital signs reviewed and stable  Report to RN Given: handoff report given  Patient transferred to: PACU    Handoff Report: Identifed the Patient, Identified the Reponsible Provider, Reviewed the pertinent medical history, Discussed the surgical course, Reviewed Intra-OP anesthesia mangement and issues during anesthesia, Set expectations for post-procedure period and Allowed opportunity for questions and acknowledgement of understanding      Vitals:  Vitals Value Taken Time   /90 05/18/23 1000   Temp 36.8  C (98.2  F) 05/18/23 0946   Pulse 115 05/18/23 1003   Resp 29 05/18/23 1003   SpO2 68 % 05/18/23 1003   Vitals shown include unvalidated device data.    Electronically Signed By: Karlo Chauhan MD  May 18, 2023  10:04 AM

## 2023-05-18 NOTE — DISCHARGE INSTRUCTIONS
Same-Day Surgery   Adult Discharge Orders & Instructions     For 24 hours after surgery:  Get plenty of rest.  A responsible adult must stay with you for at least 24 hours after you leave the hospital.   Pain medication can slow your reflexes. Do not drive or use heavy equipment.  If you have weakness or tingling, don't drive or use heavy equipment until this feeling goes away.  Mixing alcohol and pain medication can cause dizziness and slow your breathing. It can even be fatal. Do not drink alcohol while taking pain medication.  Avoid strenuous or risky activities.  Ask for help when climbing stairs.   You may feel lightheaded.  If so, sit for a few minutes before standing.  Have someone help you get up.   If you have nausea (feel sick to your stomach), drink only clear liquids such as apple juice, ginger ale, broth or 7-Up.  Rest may also help.  Be sure to drink enough fluids.  Move to a regular diet as you feel able. Take pain medications with a small amount of solid food, such as toast or crackers, to avoid nausea.   A slight fever is normal. Call the doctor if your fever is over 100 F (37.7 C) (taken under the tongue) or lasts longer than 24 hours.  You may have a dry mouth, muscle aches, trouble sleeping or a sore throat.  These symptoms should go away after 24 hours.  Do not make important or legal decisions.   Pain Management:      1. Take pain medication (if prescribed) for pain as directed by your physician.        2. WARNING: If the pain medication you have been prescribed contains Tylenol  (acetaminophen), DO NOT take additional doses of Tylenol (acetaminophen).     Call your doctor for any of the followin.  Signs of infection (fever, growing tenderness at the surgery site, severe pain, a large amount of drainage or bleeding, foul-smelling drainage, redness, swelling).    2.  It has been over 8 to 10 hours since surgery and you are still not able to urinate (pee).    3.  Headache for over 24  hours.    4.  Numbness, tingling or weakness the day after surgery (if you had spinal anesthesia).  To contact a doctor, call ___Dr. Pawel Beckham, [facility] at [phone] ___ or:  '   [phone] and ask for the Resident On Call for:          ___Dentistry____ (answered 24 hours a day)  '   Emergency Department:  [facility]: [phone]  [facility]: [phone]

## 2023-05-18 NOTE — OP NOTE
[facility] OR Dental Procedure Note    Patient:   Madeline Person    Date of birth 1990   MRN:  [id number]   Date of Visit:  05/18/2023   Date of Admission 5/18/2023     Treatment Completed   General Anesthesia Start:  Madeline Person is a 33 year old female brought into the operating room and draped in the usual customary [facility] fashion. Following the time-out procedures, the patient was placed under general anesthesia care via Right naris.    Under GA, the following treatments were performed:   Bilateral dental examination,   4 BWs and 2 PA radiographs were taken and reviewed.  Moist throat pack was placed, betadine applied circumferentially to oral cavity.   Pre-procedural rinse included: Chlorhexidine 0.12% solution followed by a 50/50 solution of sterile saline and hydrogen peroxide.    Following the exam and radiographs, the patient was seen by the OMFS team for surgical extraction of teeth #17, 32 (see their op note for details) completed by Dr. Almaguer (DDS), and Dr. Barnes (DMD).    Patient care given to general dental team for following procedures:     Periodontal Treatment: Full mouth prophylaxis: Bulk debridement completed with the Cavitron, followed use of hand scalers as necessary. Slow speed polish with medium grit polishing paste. All contacts flossed.      Sodium Fluoride Varnish 5% placed on remaining teeth.     Throat pack placed at: 08:21  Throat pack removed at: 09:27  The oropharynx was inspected and found to be clear.   Estimated blood loss: 20ml (dental extractions) + 2ml (all other dental procedures)    Dental procedure Finish:  After the dental procedure, the patient was transferred to recovery room in good condition under the lead of the CRNA.The patient s family was informed by the dental team about the dental findings and procedures performed. Verbal and written post-op instructions given. All questions and concerns were invited and answered, patient  and patient's family left pleased with treatment.       Clinic contact information:   [facility]  [address], [place] [address] [phone]  ([facility] bld[facility]     Pre - Procedure   Patient name: Madeline Person  : 1990  Medical record number:  [id number]  Dental procedures performed on: 2023  It was deemed necessary for this patient to be seen in the hospital operating room because pt is not able to be seen in clinic due to: Developmental Delays    Pre-procedure with patient & guardian:   Consent: Risks complications including but not limited to post-operative pain, swelling, bleeding, infection, temporary/permanent paresthesia/anesthesia of CN V3, lingual nerve, failure to resolve chief complaint. Patient's guardian agreed to procedure as written and signed consent after all questions were invited and answered.    Providers     Dental attending:Pawel Beckham DDS, [facility]   Dental resident Antonio Early MD, [facility], PGY-1  Dental resident: Mode May DDS, PGY-1  Anesthesiologist: Shana Soto MD  CRNA: Antoinette Lala APRN CRNA  Anesthesia Resident: Karlo Pastor MD  Scrub RN: Katherine & Anastasia  Circulator RN: Alexander      Patient review   Patient Health history: History is obtained from the patient's parent  The 10 point Review of Systems is negative other than noted in the HPI and pertinent information mentioned above.     History of Present Illness:  Madeline Person is a 33 year old female who presents to the OR for comprehensive dental treatment.     ASA 3 - Patient with moderate systemic disease with functional limitations    Physical Exam:  Vitals were reviewed  Temp: 98.2  F (36.8  C) Temp src: Axillary BP: 120/87 Pulse: (!) 127   Resp: 16 SpO2: 100 % O2 Device: Simple face mask Oxygen Delivery: 8 LPM    Medical, Surgical, Social History       Past Medical History:    Diagnosis Date     Cerebral palsy (H)      Spastic quadriplegia (H)          Past Surgical History:   Procedure Laterality Date     ------------OTHER-------------      Baclofen Pump Insertion     ------------OTHER-------------      bilateral femur osteotomy     EXAM UNDER ANESTHESIA DENTAL, RESTORATION N/A 1/8/2020    Procedure: Dental Exam, Radiographs, Periodontal Therapy, flouride varnish;  Surgeon: Blair Borges DDS;  [facility]     EXAM UNDER ANESTHESIA, RESTORATIONS, EXTRACTION(S) DENTAL COMPLEX, COMBINED N/A 4/9/2015    Procedure: COMBINED EXAM UNDER ANESTHESIA, RESTORATIONS, EXTRACTION(S) DENTAL COMPLEX;  Surgeon: Angelica Rubin DDS;  [facility] OR     FOOT SURGERY       ORTHOPEDIC SURGERY           Social History     Tobacco Use     Smoking status: Never     Smokeless tobacco: Never   Vaping Use     Vaping status: Not on file   Substance Use Topics     Alcohol use: No         No family history on file.      Immunizations and Allergies     Immunization History   Administered Date(s) Administered     COVID-19 Bivalent 12+ (Pfizer) 11/16/2022     COVID-19 MONOVALENT 12+ (Pfizer) 03/18/2021, 04/07/2021, 12/07/2021     DTAP (<7y) 1990, 1990     DTaP, Unspecified 1990, 1990     Flu, Unspecified 09/09/2008, 09/19/2017, 12/27/2018, 10/23/2019, 11/09/2021, 11/16/2022     HEPATITIS A (PEDS 12M-18Y) 09/09/2008, 03/20/2013     HIB (PRP-T) [birth year], 07/22/1992     HPV Quadrivalent 09/09/2008     HepA-Peds, Unspecified 09/09/2008, 03/20/2013     Hepatits B (Peds <19Y) 07/08/2003, 11/13/2003, 05/27/2004     Hpv, Unspecified  09/09/2008     Influenza Vaccine >6 months (Alfuria,Fluzone) 10/23/2019, 11/09/2021, 11/16/2022     MMR 07/22/1992, 10/19/1998     MMR/V 07/22/1992, 10/19/1998     Meningococcal ACWY (Menveo ) 09/09/2008, 03/27/2015     Meningococcal C Conjugate 09/09/2008, 03/27/2015     Poliovirus, inactivated (IPV) [birth year],  10/22/1992, 05/06/1996     TDAP (Adacel,Boostrix) 05/11/2023     TDAP Vaccine (Adacel) 03/20/2013     Td (Adult), Adsorbed 07/08/2003     Td Tetanus Not Adsbed Adult  07/08/2003     Tdap (Adult) Unspecified Formulation 07/08/2003         Allergies   Allergen Reactions     Latex      Other reaction(s): Unknown     Seasonal      Other reaction(s): Unknown         Medication     Prior to Admission Medications       Current Facility-Administered Medications Ordered in Epic   Medication Dose Route Frequency Last Rate Last Admin     fentaNYL (PF) (SUBLIMAZE) injection 25 mcg  25 mcg Intravenous Q5 Min PRN         fentaNYL (PF) (SUBLIMAZE) injection 50 mcg  50 mcg Intravenous Q5 Min PRN         HYDROmorphone (PF) (DILAUDID) injection 0.2 mg  0.2 mg Intravenous Q5 Min PRN         HYDROmorphone (PF) (DILAUDID) injection 0.4 mg  0.4 mg Intravenous Q5 Min PRN         labetalol (NORMODYNE/TRANDATE) injection 10 mg  10 mg Intravenous Once PRN         lactated ringers infusion   Intravenous Continuous         naloxone (NARCAN) injection 0.2 mg  0.2 mg Intravenous Q2 Min PRN        Or     naloxone (NARCAN) injection 0.4 mg  0.4 mg Intravenous Q2 Min PRN        Or     naloxone (NARCAN) injection 0.2 mg  0.2 mg Intramuscular Q2 Min PRN        Or     naloxone (NARCAN) injection 0.4 mg  0.4 mg Intramuscular Q2 Min PRN         ondansetron (ZOFRAN ODT) ODT tab 4 mg  4 mg Oral Q30 Min PRN        Or     ondansetron (ZOFRAN) injection 4 mg  4 mg Intravenous Q30 Min PRN         oxyCODONE (ROXICODONE) tablet 5 mg  5 mg Oral Once PRN         oxyCODONE IR (ROXICODONE) tablet 10 mg  10 mg Oral Once PRN         prochlorperazine (COMPAZINE) injection 5 mg  5 mg Intravenous Q6H PRN         Current Outpatient Medications Ordered in Epic   Medication     acetaminophen (TYLENOL) 160 MG/5ML suspension     amoxicillin (AMOXIL) 250 MG/5ML suspension     ibuprofen (ADVIL/MOTRIN) 100 MG/5ML suspension     ondansetron (ZOFRAN) 4 MG tablet         Exam and  Assessment    Madeline Person is a 33 year old female that presented to the OR at [facility] due to Pre-procedure diagnosis: Dental caries, unspecified, K02.9  and Impacted teeth, K02    Extra-Oral: No submandibular lymphadenopathy, no induration or erythema, no abnormal skin lesions, inferior border of mandible is palpable bilaterally, MARY >45mm. No physical impediment from TMJ bilaterally. No clicking of TMJ on opening and lateral movements.    Intraoral exam: Reveals moderate plaque, mild calculus, mild bleeding on probing, no clinical decay seen on teeth.     Probing depth range (mm):  Upper right: 3-5  Upper left: 3-5  Lower left: 3-5  Lower right: 3-5    Radiographic exam: Radiographs revealed no periradicular or periapical radiolucent lesions associated with teeth. Impacted wisdom teeth #17, 32.     The post-operative diagnosis was Impacted teeth, K02

## 2023-05-18 NOTE — OP NOTE
Oral & Maxillofacial Surgery   Operative Note      Procedure:   Extraction teeth #17 and #32     Pre-Operative Diagnosis:   Tooth eruption disturbance      Post-Operative Diagnosis:  Tooth eruption disturbance     Surgeon:  Raquel Almaguer DDS    Resident Surgeon:  Suresh Barnes DMD    Other surgical staff (if any):  Circulator: Alexander Paulino RN  Relief Scrub: Brianda Hurley  Scrub Person: Anastasia Blanton; Katherine Mccallum    Anesthesia  Anesthesiologist: Shana Soto MD  CRNA: Antoinette Lala APRN CRNA  Anesthesia Resident: Karlo Pastor MD    EBL: 20 mL    Drains: None    Prosthetic Devices:   * No implants in log *    Specimen:   * No specimens in log *    Complications: none    Indications for Surgery:   Madeline Person is a 34 y/o female with intellectual disability and the need to be treated for dental procedures in the OR. The OMFS team was consulted for extraction of horizontally full bony impacted teeth #17 and #32.The procedure, benefits, risks, alternatives including no treatment were discussed in detail with the patient's mother and the patient's mother elected for the patient to proceed with the planned surgery.     Procedure Description:   On the day of surgery, the patient was seen by myself and Dr. Almaguer in the pre-op holding area.  The procedure, benefits, risks, alternatives including no treatment were discussed again with the patient and an informed written consent was obtained for the planned procedure.  Patient was transported to the operating room and transferred to the OR table in a supine position.  Airway was secured via nasal tube by the anesthesia team.  A throat pack was placed, the oral cavity was scrubbed with chlorhexidine, and local anesthesia was administered as 3.6cc total of 0.25% bupivacaine with 1:200,000 epinephrine delivered via bilateral TEVIN, long buccal nerve blocks and local infiltration. The oral cavity was suctioned. Surgeons  stepped out to scrub and returned to don sterile gown and gloves. A surgical timeout was performed by all members of the team.    Tooth #17 not visualized intraorally. #15 blade used to make distobuccal releasing incision from distobuccal aspect tooth #18. #9 mucoperiosteal elevator used to reflect full thickness mucoperiosteal flap from distal aspect tooth #20 to the proximal extent of the incision. Tooth #17 not visualized at this time. Highspeed surgical handpiece used to remove crestal and buccal bone with sterile saline irrigation. Tooth #17 sectioned into multiple fragments, luxated, elevated, and delivered with rongeurs. The inferior alveolar canal was unroofed and the inferior alveolar nerve was visualized. The nerve was intact. The extraction socket and flap was copiously irrigated with sterile saline. Gel foam placed into extraction socket. 3-0 chromic gut suture placed in single interrupted fashion to re-appoximated mucosa. Hemostasis achieved.     Tooth #32 noted to be partially erupted on exam. #15 blade used to make distobuccal releasing incision from distobuccal aspect tooth #30. #9 mucoperiosteal elevator used to reflect full thickness mucoperiosteal flap from distal aspect tooth #29 to the proximal extent of the incision.  Highspeed surgical handpiece used to create buccal trough with sterile saline irrigation. Tooth #32 sectioned into multiple fragments, luxated, elevated, and delivered with rongeurs.The inferior alveolar nerve was not encountered. The extraction socket and flap was copiously irrigated with sterile saline. Gel foam placed into extraction socket. 3-0 chromic gut suture placed in single interrupted fashion to re-appoximated mucosa. Hemostasis achieved.        The patient's oral cavity was suctioned. The patient's care was given to the general dental team.      Attending staff was present for the entirety of the procedure.      Suresh Barnes DMD  [facility], PGY-4

## 2023-05-18 NOTE — BRIEF OP NOTE
[facility]    Brief Operative Note    Pre-operative diagnosis: Dental caries [K02.9]  Post-operative diagnosis Gingivitis    Procedure: Procedure(s):  Bilateral dental exam, radiographs, periodontal therapy, fluoride varnish in the mouth  Extraction of #17, 32 and any other surgical procedures as indicated  Surgeon: Surgeon(s) and Role:     * Pawel Beckham DDS - Primary     * Raquel Almaguer DDS - Assisting     * Suresh Barnes DMD - Resident - Assisting     * Mode May MD - Resident - Assisting     * Antonio Early MD - Resident - Assisting  Anesthesia: General   Estimated Blood Loss: 22 ml total (20 ml for ext of #17, 32)     Drains: None  Specimens: * No specimens in log *  Findings:   None.  Complications: None.  Implants: * No implants in log *

## 2023-05-18 NOTE — ANESTHESIA PROCEDURE NOTES
Airway       Patient location during procedure: OR       Procedure Start/Stop Times: 5/18/2023 7:58 AM  Staff -        Anesthesiologist:  Shana Soto MD       Resident/Fellow: Karlo Pastor MD       Performed By: resident  Consent for Airway        Urgency: elective  Indications and Patient Condition       Indications for airway management: low-procedural       Induction type:intravenous       Mask difficulty assessment: 1 - vent by mask    Final Airway Details       Final airway type: endotracheal airway       Successful airway: ETT - single, Nasal and IZABELLA  Endotracheal Airway Details        ETT size (mm): 7.0       Cuffed: yes       Successful intubation technique: flexible bronchoscopy       Grade View of Cords: 1       Adjucts: stylet       Position: Center       Measured from: nares       Secured at (cm): 28       Bite block used: None    Post intubation assessment        Placement verified by: capnometry, equal breath sounds and chest rise        Number of attempts at approach: 1       Number of other approaches attempted: 0       Secured with: pink tape       Ease of procedure: easy       Dentition: Intact and Unchanged    Medication(s) Administered   Medication Administration Time: 5/18/2023 7:58 AM    Additional Comments       Patient was intubated with flexible bronchoscopy for educational purposes.

## 2023-05-19 NOTE — ANESTHESIA POSTPROCEDURE EVALUATION
Patient: Madeline Person    Procedure: Procedure(s):  Bilateral dental exam, radiographs, periodontal therapy, fluoride varnish in the mouth  Extraction of #17, 32       Anesthesia Type:  General    Note:  Disposition: Outpatient   Postop Pain Control: Uneventful            Sign Out: Well controlled pain   PONV: No   Neuro/Psych: Uneventful            Sign Out: Acceptable/Baseline neuro status   Airway/Respiratory: Uneventful            Sign Out: Acceptable/Baseline resp. status   CV/Hemodynamics: Uneventful            Sign Out: Acceptable CV status; No obvious hypovolemia; No obvious fluid overload   Other NRE: NONE   DID A NON-ROUTINE EVENT OCCUR? No           Last vitals:  Vitals Value Taken Time   /88 05/18/23 1030   Temp 36.8  C (98.2  F) 05/18/23 0946   Pulse 116 05/18/23 1031   Resp 17 05/18/23 1031   SpO2 98 % 05/18/23 1031   Vitals shown include unvalidated device data.    Electronically Signed By: Shana Soto MD  May 19, 2023  11:38 AM

## 2023-09-25 ENCOUNTER — TELEPHONE (OUTPATIENT)
Dept: FAMILY MEDICINE | Facility: CLINIC | Age: 33
End: 2023-09-25
Payer: COMMERCIAL

## 2023-09-25 NOTE — TELEPHONE ENCOUNTER
Rec'd Prescription/Certificate of Medical Necessity from [employer]. Placed in PCP basket for review and signature. Fax [phone].    Fatemeh Araujo  Lead

## 2023-09-27 ENCOUNTER — MEDICAL CORRESPONDENCE (OUTPATIENT)
Dept: HEALTH INFORMATION MANAGEMENT | Facility: CLINIC | Age: 33
End: 2023-09-27

## 2023-10-03 ENCOUNTER — TRANSFERRED RECORDS (OUTPATIENT)
Dept: HEALTH INFORMATION MANAGEMENT | Facility: CLINIC | Age: 33
End: 2023-10-03
Payer: COMMERCIAL

## 2023-11-16 ENCOUNTER — TELEPHONE (OUTPATIENT)
Dept: FAMILY MEDICINE | Facility: CLINIC | Age: 33
End: 2023-11-16
Payer: COMMERCIAL

## 2023-11-16 NOTE — CONFIDENTIAL NOTE
Patient Quality Outreach    Patient is due for the following:   Cervical Cancer Screening - PAP Needed    Next Steps:   Schedule a office visit for pap    Type of outreach:    Sent [employer] message.      Questions for provider review:    None           Jose Antonio Newton MA

## 2023-12-06 ENCOUNTER — OFFICE VISIT (OUTPATIENT)
Dept: FAMILY MEDICINE | Facility: CLINIC | Age: 33
End: 2023-12-06
Payer: COMMERCIAL

## 2023-12-06 VITALS
RESPIRATION RATE: 20 BRPM | SYSTOLIC BLOOD PRESSURE: 122 MMHG | BODY MASS INDEX: 29.39 KG/M2 | WEIGHT: 150.5 LBS | OXYGEN SATURATION: 100 % | DIASTOLIC BLOOD PRESSURE: 80 MMHG | HEART RATE: 85 BPM

## 2023-12-06 DIAGNOSIS — Z00.00 ROUTINE GENERAL MEDICAL EXAMINATION AT A HEALTH CARE FACILITY: Primary | ICD-10-CM

## 2023-12-06 DIAGNOSIS — E87.0 HYPERNATREMIA: ICD-10-CM

## 2023-12-06 DIAGNOSIS — R25.2 SPASTICITY: ICD-10-CM

## 2023-12-06 DIAGNOSIS — K59.01 SLOW TRANSIT CONSTIPATION: ICD-10-CM

## 2023-12-06 DIAGNOSIS — R62.50 DEVELOPMENTAL DELAY: ICD-10-CM

## 2023-12-06 DIAGNOSIS — K21.9 GASTROESOPHAGEAL REFLUX DISEASE WITHOUT ESOPHAGITIS: ICD-10-CM

## 2023-12-06 DIAGNOSIS — G80.9 CEREBRAL PALSY, UNSPECIFIED TYPE (H): ICD-10-CM

## 2023-12-06 DIAGNOSIS — Z99.3 WHEELCHAIR DEPENDENT: ICD-10-CM

## 2023-12-06 LAB
ALBUMIN SERPL BCG-MCNC: 4.4 G/DL (ref 3.5–5.2)
ALP SERPL-CCNC: 76 U/L (ref 40–150)
ALT SERPL W P-5'-P-CCNC: 23 U/L (ref 0–50)
ANION GAP SERPL CALCULATED.3IONS-SCNC: 14 MMOL/L (ref 7–15)
AST SERPL W P-5'-P-CCNC: 28 U/L (ref 0–45)
BILIRUB SERPL-MCNC: 0.2 MG/DL
BUN SERPL-MCNC: 13.3 MG/DL (ref 6–20)
CALCIUM SERPL-MCNC: 9.8 MG/DL (ref 8.6–10)
CHLORIDE SERPL-SCNC: 109 MMOL/L (ref 98–107)
CREAT SERPL-MCNC: 0.55 MG/DL (ref 0.51–0.95)
DEPRECATED HCO3 PLAS-SCNC: 26 MMOL/L (ref 22–29)
EGFRCR SERPLBLD CKD-EPI 2021: >90 ML/MIN/1.73M2
GLUCOSE SERPL-MCNC: 89 MG/DL (ref 70–99)
POTASSIUM SERPL-SCNC: 4.8 MMOL/L (ref 3.4–5.3)
PROT SERPL-MCNC: 7.9 G/DL (ref 6.4–8.3)
SODIUM SERPL-SCNC: 149 MMOL/L (ref 135–145)

## 2023-12-06 PROCEDURE — 80053 COMPREHEN METABOLIC PANEL: CPT | Performed by: STUDENT IN AN ORGANIZED HEALTH CARE EDUCATION/TRAINING PROGRAM

## 2023-12-06 PROCEDURE — 99395 PREV VISIT EST AGE 18-39: CPT | Performed by: STUDENT IN AN ORGANIZED HEALTH CARE EDUCATION/TRAINING PROGRAM

## 2023-12-06 PROCEDURE — 36415 COLL VENOUS BLD VENIPUNCTURE: CPT | Performed by: STUDENT IN AN ORGANIZED HEALTH CARE EDUCATION/TRAINING PROGRAM

## 2023-12-06 PROCEDURE — 99214 OFFICE O/P EST MOD 30 MIN: CPT | Mod: 25 | Performed by: STUDENT IN AN ORGANIZED HEALTH CARE EDUCATION/TRAINING PROGRAM

## 2023-12-06 RX ORDER — SENNOSIDES 8.6 MG
TABLET ORAL
COMMUNITY
Start: 2023-11-30

## 2023-12-06 ASSESSMENT — ENCOUNTER SYMPTOMS
MYALGIAS: 0
SORE THROAT: 0
DIZZINESS: 0
ABDOMINAL PAIN: 0
DYSURIA: 0
WEAKNESS: 0
HEADACHES: 0
CONSTIPATION: 1
HEARTBURN: 1
CHILLS: 0
ARTHRALGIAS: 0
PALPITATIONS: 0
JOINT SWELLING: 0
NERVOUS/ANXIOUS: 0
FREQUENCY: 0
NAUSEA: 0
BREAST MASS: 0
COUGH: 0
SHORTNESS OF BREATH: 0
HEMATOCHEZIA: 0
DIARRHEA: 0
PARESTHESIAS: 0
HEMATURIA: 0
EYE PAIN: 0
FEVER: 0

## 2023-12-06 NOTE — PROGRESS NOTES
SUBJECTIVE:   Leonard is a 33 year old, presenting for the following:    Physical and Immunization        12/6/2023    10:00 AM   Additional Questions   Roomed by Pawan SAINI   Accompanied by Mom ( Dahlia)         12/6/2023    10:00 AM   Patient Reported Additional Medications   Patient reports taking the following new medications omeprozole 20 mg, senna 2 tabs     Healthy Habits:     Getting at least 3 servings of Calcium per day:  Yes    Bi-annual eye exam:  Yes    Dental care twice a year:  Yes    Sleep apnea or symptoms of sleep apnea:  None    Diet:  Regular (no restrictions)    Frequency of exercise:  2-3 days/week    Duration of exercise:  Less than 15 minutes    Taking medications regularly:  Yes    Medication side effects:  None    Additional concerns today:  Yes    Cerebral palsy  Follows with [facility] for rehab needs typically.  Has physiatrist too.  In process of getting new manual wheelchair.  Has power wheelchair.   Has shower chair, ceiling lift at home.   If needs supply, would be getting items through Dr. Johnson  Baclofen pump was removed. Wasn't in the right place.   Spasticity was managed well enough without any oral medications.  Talked with neurosurgeon - who was ok with removal.   Feels hips are a little tight right now but no significant issues.     Constipation  Has been on Benefiber forever.   Added the Sennakot  Scheduled endoscopy already.     [employer] paperwork  Start completing this form.   Just needs signature now.    Runny nose in the past couple of days.  Otherwise very healthy      Social History     Tobacco Use    Smoking status: Never     Passive exposure: Never    Smokeless tobacco: Never   Substance Use Topics    Alcohol use: No             12/6/2023     9:44 AM   Alcohol Use   Prescreen: >3 drinks/day or >7 drinks/week? Not Applicable     Reviewed orders with patient.  Reviewed health maintenance and updated orders accordingly - yes    Breast Cancer Screening:    FHS-7:        4/1/2021     1:46 PM 12/1/2022     2:18 PM 5/11/2023     3:25 PM 12/6/2023     9:45 AM   Breast CA Risk Assessment (FHS-7)   Did any of your first-degree relatives have breast or ovarian cancer? No No No Yes   Did any of your relatives have bilateral breast cancer? No No No No   Did any man in your family have breast cancer? No No No No   Did any woman in your family have breast and ovarian cancer? Yes Yes Yes Unknown   Did any woman in your family have breast cancer before age 50 y? Yes Yes Yes Yes   Do you have 2 or more relatives with breast and/or ovarian cancer? Yes Yes Yes Yes   Do you have 2 or more relatives with breast and/or bowel cancer? Yes No Yes Yes     Patient under 40 years of age: Routine Mammogram Screening not recommended.   Pertinent mammograms are reviewed under the imaging tab.    History of abnormal Pap smear: Has never completed in past     Reviewed and updated as needed this visit by clinical staff   Tobacco  Allergies  Meds    Surg Hx  Fam Hx          Reviewed and updated as needed this visit by Provider   Tobacco  Allergies     Surg Hx  Fam Hx           Review of Systems   Constitutional:  Negative for chills and fever.   HENT:  Negative for congestion, ear pain, hearing loss and sore throat.    Eyes:  Negative for pain and visual disturbance.   Respiratory:  Negative for cough and shortness of breath.    Cardiovascular:  Negative for chest pain, palpitations and peripheral edema.   Gastrointestinal:  Positive for constipation and heartburn. Negative for abdominal pain, diarrhea, hematochezia and nausea.   Breasts:  Negative for tenderness, breast mass and discharge.   Genitourinary:  Negative for dysuria, frequency, genital sores, hematuria, pelvic pain, urgency, vaginal bleeding and vaginal discharge.   Musculoskeletal:  Negative for arthralgias, joint swelling and myalgias.   Skin:  Negative for rash.   Neurological:  Negative for dizziness, weakness, headaches and  paresthesias.   Psychiatric/Behavioral:  Negative for mood changes. The patient is not nervous/anxious.      OBJECTIVE:   /80 (BP Location: Right arm, Patient Position: Sitting, Cuff Size: Adult Large)   Pulse 85   Resp 20   Wt 68.3 kg (150 lb 8 oz)   LMP 11/30/2023 (Approximate)   SpO2 100%   BMI 29.39 kg/m      Physical Exam  GENERAL: healthy, alert and no distress  HEAD: Normocephalic, atraumatic.   EYES: PERRL. Normal conjunctivae, sclera.   ENT: Normal EAC and TMs bilaterally. Normal oropharynx.   NECK: Supple. No lymphadenopathy appreciated. Trachea midline. Thyroid not enlarged, not TTP.  RESP: lungs clear to auscultation - no rales, rhonchi or wheezes  CV: regular rate and rhythm, normal S1 S2, no murmur, click, rub or gallop. No peripheral swelling noted.   ABDOMEN: soft, no TTP x4 quadrants. No hepatomegaly or masses appreciated. BS normactive.  MSK: Flexion contractures present in UE and LE bilaterally.  SKIN: no suspicious lesions or rashes.  EXT: Warm and well perfused.   NEURO: CNII-XII grossly intact. Spasticity present in extremities. No focal deficits.  PSYCH: Groomed, dressed appropriately for weather.     ASSESSMENT/PLAN:   (Z00.00) Routine general medical examination at a health care facility  (primary encounter diagnosis)  Doing well overall. Reviewed last lipid from 2021. Due for pap smear, has never had in past, never sexually active thus much lower risk for cervical cancer but discussed that would still recommend. Mother to consider. Discussed vaccination recommendations.    (G80.9) Cerebral palsy, unspecified type (H)  (R62.50) Developmental delay  (Z99.3) Wheelchair dependent  Wheelchair dependent. Home equipped with assistance devices such as ceiling lift. Completed [employer] paperwork for patient today. Working with physiatrist through [facility].    (R25.2) Spasticity  S/p intrathecal baclofen pump which was ultimately removed. Does not seem affected by spasticity, not  currently requiring any oral pharmacotherapy. Working with physiatrist through [facility], as above.    (K59.01) Slow transit constipation  (K21.9) Gastroesophageal reflux disease without esophagitis  Following with GI. On daily Benefiber. Sennakot and omeprazole added recently.  Plans for EGD to evaluate for vomiting.  Plan: Comprehensive metabolic panel (BMP + Alb, Alk         Phos, ALT, AST, Total. Bili, TP)    COUNSELING:  Reviewed preventive health counseling, as reflected in patient instructions    BMI:   Estimated body mass index is 29.39 kg/m  as calculated from the following:    Height as of 5/18/23: 1.524 m (5').    Weight as of this encounter: 68.3 kg (150 lb 8 oz).     She reports that she has never smoked. She has never been exposed to tobacco smoke. She has never used smokeless tobacco.          Lamberto Mills MD  [facility]  12/7/2023

## 2023-12-06 NOTE — PROGRESS NOTES
"Prior to immunization administration, verified patients identity using patient s name and date of birth. Please see Immunization Activity for additional information.     Screening Questionnaire for Adult Immunization    Are you sick today?   {:788390}   Do you have allergies to medications, food, a vaccine component or latex?   {:044861}   Have you ever had a serious reaction after receiving a vaccination?   {:169649}   Do you have a long-term health problem with heart, lung, kidney, or metabolic disease (e.g., diabetes), asthma, a blood disorder, no spleen, complement component deficiency, a cochlear implant, or a spinal fluid leak?  Are you on long-term aspirin therapy?   {:870263}   Do you have cancer, leukemia, HIV/AIDS, or any other immune system problem?   {:447960}   Do you have a parent, brother, or sister with an immune system problem?   {:827588}   In the past 3 months, have you taken medications that affect  your immune system, such as prednisone, other steroids, or anticancer drugs; drugs for the treatment of rheumatoid arthritis, Crohn s disease, or psoriasis; or have you had radiation treatments?   {:057810}   Have you had a seizure, or a brain or other nervous system problem?   {:302442}   During the past year, have you received a transfusion of blood or blood    products, or been given immune (gamma) globulin or antiviral drug?   {:785309}   For women: Are you pregnant or is there a chance you could become       pregnant during the next month?   {:820478}   Have you received any vaccinations in the past 4 weeks?   {:930042}     Immunization questionnaire { :315348::\"answers were all negative.\"}      Patient instructed to remain in clinic for 15 minutes afterwards, and to report any adverse reactions.     Screening performed by Pawan Madsen MA on 12/6/2023 at 9:47 AM.    "

## 2023-12-07 PROBLEM — R25.2 SPASTICITY: Status: ACTIVE | Noted: 2023-12-07

## 2023-12-07 PROBLEM — R62.50 DEVELOPMENTAL DELAY: Status: ACTIVE | Noted: 2023-12-07

## 2024-01-09 ENCOUNTER — OFFICE VISIT (OUTPATIENT)
Dept: FAMILY MEDICINE | Facility: CLINIC | Age: 34
End: 2024-01-09
Payer: COMMERCIAL

## 2024-01-09 VITALS
HEIGHT: 60 IN | RESPIRATION RATE: 19 BRPM | BODY MASS INDEX: 29.55 KG/M2 | HEART RATE: 103 BPM | DIASTOLIC BLOOD PRESSURE: 72 MMHG | OXYGEN SATURATION: 99 % | SYSTOLIC BLOOD PRESSURE: 118 MMHG | TEMPERATURE: 98.4 F | WEIGHT: 150.5 LBS

## 2024-01-09 DIAGNOSIS — G80.9 INFANTILE CEREBRAL PALSY (H): ICD-10-CM

## 2024-01-09 DIAGNOSIS — R19.8 GAGGING EPISODE: ICD-10-CM

## 2024-01-09 DIAGNOSIS — G80.0 SPASTIC QUADRIPLEGIC CEREBRAL PALSY (H): ICD-10-CM

## 2024-01-09 DIAGNOSIS — Z01.818 PREOP GENERAL PHYSICAL EXAM: Primary | ICD-10-CM

## 2024-01-09 DIAGNOSIS — E87.0 HYPERNATREMIA: ICD-10-CM

## 2024-01-09 PROCEDURE — 99214 OFFICE O/P EST MOD 30 MIN: CPT | Performed by: STUDENT IN AN ORGANIZED HEALTH CARE EDUCATION/TRAINING PROGRAM

## 2024-01-09 NOTE — PROGRESS NOTES
[facility]  [address] [address]  Phone: [phone]  Primary Provider: Rosemary Johnson  Pre-op Performing Provider: LAMBERTO MILLS    PREOPERATIVE EVALUATION:  Today's date: 1/9/2024    Leonard is a 33 year old, presenting for the following:  Pre-Op Exam        1/9/2024     9:44 AM   Additional Questions   Roomed by jus     Surgical Information:  Surgery/Procedure: ESOPHAGOGASTRODUODENOSCOPY   Surgery Location: [facility]   Surgeon: George Pelayo MD  Surgery Date: 1/22/2024  Time of Surgery: 0900  Where patient plans to recover: At home with family  Fax number for surgical facility: Note does not need to be faxed, will be available electronically in Epic.    Assessment & Plan     The proposed surgical procedure is considered LOW risk.    Preop general physical exam  Gagging episode    Hypernatremia  Likely 2/2 inadequate fluid intake.   - Basic metabolic panel  (Ca, Cl, CO2, Creat, Gluc, K, Na, BUN)    Spastic quadriplegic cerebral palsy (H)  Infantile cerebral palsy (H)  Wheelchair dependent. Home equipped with assistance devices such as ceiling lift. Working with physiatrist through [facility].      - No identified additional risk factors other than previously addressed    Antiplatelet or Anticoagulation Medication Instructions:   - Patient is on no antiplatelet or anticoagulation medications.    Additional Medication Instructions:  Patient is to take all scheduled medications on the day of surgery  Hold all NSAIDs 7-10 days prior to procedure  Hold all topical creams and senna on day of surgery    RECOMMENDATION:  APPROVAL GIVEN to proceed with proposed procedure, without further diagnostic evaluation.    Follow up in 11 months for annual physical    Lamberto Mills MD  [facility]  1/9/2024    Subjective     HPI related to upcoming procedure:   Endoscopy for ongoing gagging.         1/9/2024     9:44 AM   Preop Questions   1. Have you ever had a  heart attack or stroke? No   2. Have you ever had surgery on your heart or blood vessels, such as a stent placement, a coronary artery bypass, or surgery on an artery in your head, neck, heart, or legs? No   3. Do you have chest pain with activity? UNKNOWN - unable to assess with underlying developmental delay   4. Do you have a history of  heart failure? No   5. Do you currently have a cold, bronchitis or symptoms of other infection? No   6. Do you have a cough, shortness of breath, or wheezing? No   7. Do you or anyone in your family have previous history of blood clots? UNKNOWN - dad with cardiac arrest from massive MI   8. Do you or does anyone in your family have a serious bleeding problem such as prolonged bleeding following surgeries or cuts? No   9. Have you ever had problems with anemia or been told to take iron pills? No   10. Have you had any abnormal blood loss such as black, tarry or bloody stools, or abnormal vaginal bleeding? No   11. Have you ever had a blood transfusion? YES    11a. Have you ever had a transfusion reaction? No   12. Are you willing to have a blood transfusion if it is medically needed before, during, or after your surgery? Yes   13. Have you or any of your relatives ever had problems with anesthesia? YES, maternal grandmother with ?allergic reaction with anesthesia (not life threatening), but patient has always done well under general anesthesia.    14. Do you have sleep apnea, excessive snoring or daytime drowsiness? No   15. Do you have any artifical heart valves or other implanted medical devices like a pacemaker, defibrillator, or continuous glucose monitor? No   16. Do you have artificial joints? No   17. Are you allergic to latex? No   18. Is there any chance that you may be pregnant? No     Health Care Directive:  Patient has a Health Care Directive on file    Preoperative Review of :   reviewed - controlled substances reflected in medication list.    Status of Chronic  Conditions:  See problem list for active medical problems.  Problems all longstanding and stable, except as noted/documented.  See ROS for pertinent symptoms related to these conditions.    Review of Systems  Constitutional, neuro, ENT, endocrine, pulmonary, cardiac, gastrointestinal, genitourinary, musculoskeletal, integument and psychiatric systems are negative, except as otherwise noted.    Patient Active Problem List    Diagnosis Date Noted    Developmental delay 12/07/2023     Priority: Medium    Spasticity 12/07/2023     Priority: Medium    Slow transit constipation 12/06/2023     Priority: Medium     Following with GI      Wheelchair dependent 12/06/2023     Priority: Medium    Gastroesophageal reflux disease without esophagitis 12/06/2023     Priority: Medium     Following with GI. Plan for empiric omeprazole while family considers barium esophagram vs endoscopy.      Infantile spasms (H) 05/17/2023     Priority: Medium    [employer] Paperwork completed.  01/06/2020     Priority: Medium    Infantile cerebral palsy (H) 02/06/2006     Priority: Medium     Epic        Past Medical History:   Diagnosis Date    Cerebral palsy (H)     Spastic quadriplegia (H)      Past Surgical History:   Procedure Laterality Date    ------------OTHER-------------      Baclofen Pump Insertion    ------------OTHER-------------      bilateral femur osteotomy    EXAM UNDER ANESTHESIA DENTAL, RESTORATION N/A 1/8/2020    Procedure: Dental Exam, Radiographs, Periodontal Therapy, flouride varnish;  Surgeon: Blair Borges DDS;  Location: [facility] OR    EXAM UNDER ANESTHESIA, RESTORATIONS, EXTRACTION(S) DENTAL COMPLEX, COMBINED N/A 4/9/2015    Procedure: COMBINED EXAM UNDER ANESTHESIA, RESTORATIONS, EXTRACTION(S) DENTAL COMPLEX;  Surgeon: Angelica Rubin DDS;  Location: [facility] OR    EXAM UNDER ANESTHESIA, RESTORATIONS, EXTRACTION(S) DENTAL COMPLEX, COMBINED N/A 5/18/2023    Procedure: Bilateral dental exam, radiographs, periodontal  therapy, fluoride varnish in the mouth;  Surgeon: Pawel Beckham DDS;  Location: [facility] OR    EXTRACTION(S) DENTAL N/A 5/18/2023    Procedure: Extraction of #17, 32;  Surgeon: Pawel Beckham DDS;  Location: [facility] OR    FOOT SURGERY      ORTHOPEDIC SURGERY       Current Outpatient Medications   Medication Sig Dispense Refill    Ascorbic Acid (VITAMIN C) 100 MG CHEW Take 1 chew tab by mouth daily      biotin 2.5 MG TABS Take 2 chew tab by mouth daily      bisacodyl (DULCOLAX) 10 MG suppository Place 1 suppository (10 mg) rectally daily as needed for constipation 30 suppository 6    cholecalciferol (VITAMIN D3) 5000 units (125 mcg) capsule Take 5,000 Units by mouth daily      Guar Gum (FIBER MODULAR, NUTRISOURCE FIBER,) packet Take 1 packet by mouth daily      Multiple Vitamins-Minerals (MULTIVITAMIN GUMMIES ADULT PO) Take 2 chew tab by mouth daily       omeprazole (PRILOSEC) 20 MG DR capsule Take 1 tablet by mouth daily at 2 pm      ondansetron (ZOFRAN) 4 MG tablet Take 1 tablet (4 mg) by mouth every 8 hours as needed for nausea 12 tablet 0    sennosides (SENOKOT) 8.6 MG tablet TAKE 2 TABLETS BY MOUTH DAILY 5 DAYS EVERY WEEK       Allergies   Allergen Reactions    Latex      Other reaction(s): Unknown    Seasonal      Other reaction(s): Unknown      Social History     Tobacco Use    Smoking status: Never     Passive exposure: Never    Smokeless tobacco: Never   Substance Use Topics    Alcohol use: No     History   Drug Use No         Objective     /72   Pulse 103   Temp 98.4  F (36.9  C) (Tympanic)   Resp 19   Ht 1.524 m (5')   Wt 68.3 kg (150 lb 8 oz)   LMP 12/28/2023 (Within Days)   SpO2 99%   BMI 29.39 kg/m      Physical Exam  GENERAL: healthy, alert and no distress  HEAD: Normocephalic, atraumatic.   EYES: PERRL. Normal conjunctivae, sclera.   ENT: Normal EAC and TMs bilaterally. Normal oropharynx.   NECK: Supple. No lymphadenopathy appreciated. Trachea midline. Thyroid not enlarged, not TTP.  RESP:  lungs clear to auscultation - no rales, rhonchi or wheezes  CV: regular rate and rhythm, normal S1 S2, no murmur, click, rub or gallop.  No peripheral swelling noted.   ABDOMEN: soft, no TTP x4 quadrants. No hepatomegaly or masses appreciated. BS normactive.  MSK: no gross musculoskeletal defects noted.  SKIN: no suspicious lesions or rashes.  EXT: Warm and well perfused.   NEURO: CNII-XII grossly intact. No focal deficits.  PSYCH: Groomed, dressed appropriately for weather. Normal mood with consistent affect.     Recent Labs   Lab Test 12/06/23  1055   *   POTASSIUM 4.8   CR 0.55      Diagnostics:  Labs pending at this time.  Results will be reviewed when available.   No EKG required for low risk surgery (cataract, skin procedure, breast biopsy, etc).    Revised Cardiac Risk Index (RCRI):  The patient has the following serious cardiovascular risks for perioperative complications:   - No serious cardiac risks = 0 points     RCRI Interpretation: 0 points: Class I (very low risk - 0.4% complication rate)    Signed Electronically by: Lamberto Mills MD  Copy of this evaluation report is provided to requesting physician.

## 2024-01-09 NOTE — PATIENT INSTRUCTIONS
Preparing for Your Surgery  Getting started  A nurse will call you to review your health history and instructions. They will give you an arrival time based on your scheduled surgery time. Please be ready to share:  Your doctor's clinic name and phone number  Your medical, surgical, and anesthesia history  A list of allergies and sensitivities  A list of medicines, including herbal treatments and over-the-counter drugs  Whether the patient has a legal guardian (ask how to send us the papers in advance)  Please tell us if you're pregnant--or if there's any chance you might be pregnant. Some surgeries may injure a fetus (unborn baby), so they require a pregnancy test. Surgeries that are safe for a fetus don't always need a test, and you can choose whether to have one.   If you have a child who's having surgery, please ask for a copy of Preparing for Your Child's Surgery.    Preparing for surgery  Within 10 to 30 days of surgery: Have a pre-op exam (sometimes called an H&P, or History and Physical). This can be done at a clinic or pre-operative center.  If you're having a , you may not need this exam. Talk to your care team.  At your pre-op exam, talk to your care team about all medicines you take. If you need to stop any medicines before surgery, ask when to start taking them again.  We do this for your safety. Many medicines can make you bleed too much during surgery. Some change how well surgery (anesthesia) drugs work.  Call your insurance company to let them know you're having surgery. (If you don't have insurance, call [phone].)  Call your clinic if there's any change in your health. This includes signs of a cold or flu (sore throat, runny nose, cough, rash, fever). It also includes a scrape or scratch near the surgery site.  If you have questions on the day of surgery, call your hospital or surgery center.  Eating and drinking guidelines  For your safety: Unless your surgeon tells you otherwise,  follow the guidelines below.  Eat and drink as usual until 8 hours before you arrive for surgery. After that, no food or milk.  Drink clear liquids until 2 hours before you arrive. These are liquids you can see through, like water, Gatorade, and Propel Water. They also include plain black coffee and tea (no cream or milk), candy, and breath mints. You can spit out gum when you arrive.  If you drink alcohol: Stop drinking it the night before surgery.  If your care team tells you to take medicine on the morning of surgery, it's okay to take it with a sip of water.  Preventing infection  Shower or bathe the night before and morning of your surgery. Follow the instructions your clinic gave you. (If no instructions, use regular soap.)  Don't shave or clip hair near your surgery site. We'll remove the hair if needed.  Don't smoke or vape the morning of surgery. You may chew nicotine gum up to 2 hours before surgery. A nicotine patch is okay.  Note: Some surgeries require you to completely quit smoking and nicotine. Check with your surgeon.  Your care team will make every effort to keep you safe from infection. We will:  Clean our hands often with soap and water (or an alcohol-based hand rub).  Clean the skin at your surgery site with a special soap that kills germs.  Give you a special gown to keep you warm. (Cold raises the risk of infection.)  Wear special hair covers, masks, gowns and gloves during surgery.  Give antibiotic medicine, if prescribed. Not all surgeries need antibiotics.  What to bring on the day of surgery  Photo ID and insurance card  Copy of your health care directive, if you have one  Glasses and hearing aids (bring cases)  You can't wear contacts during surgery  Inhaler and eye drops, if you use them (tell us about these when you arrive)  CPAP machine or breathing device, if you use them  A few personal items, if spending the night  If you have . . .  A pacemaker, ICD (cardiac defibrillator) or other  implant: Bring the ID card.  An implanted stimulator: Bring the remote control.  A legal guardian: Bring a copy of the certified (court-stamped) guardianship papers.  Please remove any jewelry, including body piercings. Leave jewelry and other valuables at home.  If you're going home the day of surgery  You must have a responsible adult drive you home. They should stay with you overnight as well.  If you don't have someone to stay with you, and you aren't safe to go home alone, we may keep you overnight. Insurance often won't pay for this.  After surgery  If it's hard to control your pain or you need more pain medicine, please call your surgeon's office.  Questions?   If you have any questions for your care team, list them here: _________________________________________________________________________________________________________________________________________________________________________ ____________________________________ ____________________________________ ____________________________________  For informational purposes only. Not to replace the advice of your health care provider. Copyright   2003, 2019 [facility]. All rights reserved. Clinically reviewed by Arabella Kc MD. [employer] [id number] - REV 12/22.

## 2024-01-15 ENCOUNTER — LAB (OUTPATIENT)
Dept: LAB | Facility: CLINIC | Age: 34
End: 2024-01-15
Payer: COMMERCIAL

## 2024-01-15 DIAGNOSIS — E87.0 HYPERNATREMIA: ICD-10-CM

## 2024-01-15 LAB
ANION GAP SERPL CALCULATED.3IONS-SCNC: 11 MMOL/L (ref 7–15)
BUN SERPL-MCNC: 10.8 MG/DL (ref 6–20)
CALCIUM SERPL-MCNC: 9.7 MG/DL (ref 8.6–10)
CHLORIDE SERPL-SCNC: 105 MMOL/L (ref 98–107)
CREAT SERPL-MCNC: 0.51 MG/DL (ref 0.51–0.95)
DEPRECATED HCO3 PLAS-SCNC: 25 MMOL/L (ref 22–29)
EGFRCR SERPLBLD CKD-EPI 2021: >90 ML/MIN/1.73M2
GLUCOSE SERPL-MCNC: 96 MG/DL (ref 70–99)
POTASSIUM SERPL-SCNC: 3.9 MMOL/L (ref 3.4–5.3)
SODIUM SERPL-SCNC: 141 MMOL/L (ref 135–145)

## 2024-01-15 PROCEDURE — 80048 BASIC METABOLIC PNL TOTAL CA: CPT

## 2024-01-15 PROCEDURE — 36415 COLL VENOUS BLD VENIPUNCTURE: CPT

## 2024-01-22 ENCOUNTER — ANESTHESIA EVENT (OUTPATIENT)
Dept: GASTROENTEROLOGY | Facility: CLINIC | Age: 34
End: 2024-01-22
Payer: COMMERCIAL

## 2024-01-22 ENCOUNTER — ANESTHESIA (OUTPATIENT)
Dept: GASTROENTEROLOGY | Facility: CLINIC | Age: 34
End: 2024-01-22
Payer: COMMERCIAL

## 2024-01-22 ENCOUNTER — HOSPITAL ENCOUNTER (OUTPATIENT)
Facility: CLINIC | Age: 34
Discharge: HOME | End: 2024-01-22
Attending: INTERNAL MEDICINE | Admitting: INTERNAL MEDICINE
Payer: COMMERCIAL

## 2024-01-22 VITALS
RESPIRATION RATE: 16 BRPM | TEMPERATURE: 98.2 F | DIASTOLIC BLOOD PRESSURE: 83 MMHG | SYSTOLIC BLOOD PRESSURE: 148 MMHG | HEART RATE: 101 BPM | OXYGEN SATURATION: 100 %

## 2024-01-22 LAB — UPPER GI ENDOSCOPY: NORMAL

## 2024-01-22 PROCEDURE — 43239 EGD BIOPSY SINGLE/MULTIPLE: CPT | Performed by: INTERNAL MEDICINE

## 2024-01-22 PROCEDURE — 250N000009 HC RX 250: Performed by: REGISTERED NURSE

## 2024-01-22 PROCEDURE — 999N000010 HC STATISTIC ANES STAT CODE-CRNA PER MINUTE: Performed by: INTERNAL MEDICINE

## 2024-01-22 PROCEDURE — 88305 TISSUE EXAM BY PATHOLOGIST: CPT | Mod: 26

## 2024-01-22 PROCEDURE — 258N000003 HC RX IP 258 OP 636: Performed by: REGISTERED NURSE

## 2024-01-22 PROCEDURE — 88305 TISSUE EXAM BY PATHOLOGIST: CPT | Mod: TC | Performed by: INTERNAL MEDICINE

## 2024-01-22 PROCEDURE — 250N000011 HC RX IP 250 OP 636: Performed by: REGISTERED NURSE

## 2024-01-22 PROCEDURE — 370N000017 HC ANESTHESIA TECHNICAL FEE, PER MIN: Performed by: INTERNAL MEDICINE

## 2024-01-22 RX ORDER — LABETALOL 20 MG/4 ML (5 MG/ML) INTRAVENOUS SYRINGE
10
Status: DISCONTINUED | OUTPATIENT
Start: 2024-01-22 | End: 2024-01-22 | Stop reason: HOSPADM

## 2024-01-22 RX ORDER — HYDROMORPHONE HCL IN WATER/PF 6 MG/30 ML
0.4 PATIENT CONTROLLED ANALGESIA SYRINGE INTRAVENOUS EVERY 5 MIN PRN
Status: DISCONTINUED | OUTPATIENT
Start: 2024-01-22 | End: 2024-01-22 | Stop reason: HOSPADM

## 2024-01-22 RX ORDER — ONDANSETRON 2 MG/ML
4 INJECTION INTRAMUSCULAR; INTRAVENOUS EVERY 6 HOURS PRN
Status: CANCELLED | OUTPATIENT
Start: 2024-01-22

## 2024-01-22 RX ORDER — ONDANSETRON 4 MG/1
4 TABLET, ORALLY DISINTEGRATING ORAL EVERY 30 MIN PRN
Status: DISCONTINUED | OUTPATIENT
Start: 2024-01-22 | End: 2024-01-22 | Stop reason: HOSPADM

## 2024-01-22 RX ORDER — PROCHLORPERAZINE MALEATE 10 MG
10 TABLET ORAL EVERY 6 HOURS PRN
Status: CANCELLED | OUTPATIENT
Start: 2024-01-22

## 2024-01-22 RX ORDER — ONDANSETRON 2 MG/ML
4 INJECTION INTRAMUSCULAR; INTRAVENOUS
Status: DISCONTINUED | OUTPATIENT
Start: 2024-01-22 | End: 2024-01-22 | Stop reason: HOSPADM

## 2024-01-22 RX ORDER — ONDANSETRON 4 MG/1
4 TABLET, ORALLY DISINTEGRATING ORAL EVERY 6 HOURS PRN
Status: CANCELLED | OUTPATIENT
Start: 2024-01-22

## 2024-01-22 RX ORDER — LIDOCAINE 40 MG/G
CREAM TOPICAL
Status: DISCONTINUED | OUTPATIENT
Start: 2024-01-22 | End: 2024-01-22 | Stop reason: HOSPADM

## 2024-01-22 RX ORDER — FLUMAZENIL 0.1 MG/ML
0.2 INJECTION, SOLUTION INTRAVENOUS
Status: CANCELLED | OUTPATIENT
Start: 2024-01-22 | End: 2024-01-22

## 2024-01-22 RX ORDER — SODIUM CHLORIDE, SODIUM LACTATE, POTASSIUM CHLORIDE, CALCIUM CHLORIDE 600; 310; 30; 20 MG/100ML; MG/100ML; MG/100ML; MG/100ML
INJECTION, SOLUTION INTRAVENOUS CONTINUOUS
Status: DISCONTINUED | OUTPATIENT
Start: 2024-01-22 | End: 2024-01-22 | Stop reason: HOSPADM

## 2024-01-22 RX ORDER — LIDOCAINE HYDROCHLORIDE 20 MG/ML
INJECTION, SOLUTION INFILTRATION; PERINEURAL PRN
Status: DISCONTINUED | OUTPATIENT
Start: 2024-01-22 | End: 2024-01-22

## 2024-01-22 RX ORDER — ONDANSETRON 2 MG/ML
INJECTION INTRAMUSCULAR; INTRAVENOUS PRN
Status: DISCONTINUED | OUTPATIENT
Start: 2024-01-22 | End: 2024-01-22

## 2024-01-22 RX ORDER — ONDANSETRON 2 MG/ML
4 INJECTION INTRAMUSCULAR; INTRAVENOUS EVERY 30 MIN PRN
Status: DISCONTINUED | OUTPATIENT
Start: 2024-01-22 | End: 2024-01-22 | Stop reason: HOSPADM

## 2024-01-22 RX ORDER — NALOXONE HYDROCHLORIDE 0.4 MG/ML
0.2 INJECTION, SOLUTION INTRAMUSCULAR; INTRAVENOUS; SUBCUTANEOUS
Status: CANCELLED | OUTPATIENT
Start: 2024-01-22

## 2024-01-22 RX ORDER — FENTANYL CITRATE 50 UG/ML
25 INJECTION, SOLUTION INTRAMUSCULAR; INTRAVENOUS EVERY 5 MIN PRN
Status: DISCONTINUED | OUTPATIENT
Start: 2024-01-22 | End: 2024-01-22 | Stop reason: HOSPADM

## 2024-01-22 RX ORDER — PROPOFOL 10 MG/ML
INJECTION, EMULSION INTRAVENOUS PRN
Status: DISCONTINUED | OUTPATIENT
Start: 2024-01-22 | End: 2024-01-22

## 2024-01-22 RX ORDER — PROPOFOL 10 MG/ML
INJECTION, EMULSION INTRAVENOUS CONTINUOUS PRN
Status: DISCONTINUED | OUTPATIENT
Start: 2024-01-22 | End: 2024-01-22

## 2024-01-22 RX ORDER — FENTANYL CITRATE 50 UG/ML
50 INJECTION, SOLUTION INTRAMUSCULAR; INTRAVENOUS EVERY 5 MIN PRN
Status: DISCONTINUED | OUTPATIENT
Start: 2024-01-22 | End: 2024-01-22 | Stop reason: HOSPADM

## 2024-01-22 RX ORDER — HYDROMORPHONE HCL IN WATER/PF 6 MG/30 ML
0.2 PATIENT CONTROLLED ANALGESIA SYRINGE INTRAVENOUS EVERY 5 MIN PRN
Status: DISCONTINUED | OUTPATIENT
Start: 2024-01-22 | End: 2024-01-22 | Stop reason: HOSPADM

## 2024-01-22 RX ORDER — NALOXONE HYDROCHLORIDE 0.4 MG/ML
0.4 INJECTION, SOLUTION INTRAMUSCULAR; INTRAVENOUS; SUBCUTANEOUS
Status: CANCELLED | OUTPATIENT
Start: 2024-01-22

## 2024-01-22 RX ORDER — SODIUM CHLORIDE, SODIUM LACTATE, POTASSIUM CHLORIDE, CALCIUM CHLORIDE 600; 310; 30; 20 MG/100ML; MG/100ML; MG/100ML; MG/100ML
INJECTION, SOLUTION INTRAVENOUS CONTINUOUS PRN
Status: DISCONTINUED | OUTPATIENT
Start: 2024-01-22 | End: 2024-01-22

## 2024-01-22 RX ADMIN — SODIUM CHLORIDE, POTASSIUM CHLORIDE, SODIUM LACTATE AND CALCIUM CHLORIDE: 600; 310; 30; 20 INJECTION, SOLUTION INTRAVENOUS at 09:21

## 2024-01-22 RX ADMIN — PROPOFOL 150 MCG/KG/MIN: 10 INJECTION, EMULSION INTRAVENOUS at 09:26

## 2024-01-22 RX ADMIN — PROPOFOL 20 MG: 10 INJECTION, EMULSION INTRAVENOUS at 09:31

## 2024-01-22 RX ADMIN — SUGAMMADEX 200 MG: 100 INJECTION, SOLUTION INTRAVENOUS at 09:41

## 2024-01-22 RX ADMIN — ONDANSETRON 4 MG: 2 INJECTION INTRAMUSCULAR; INTRAVENOUS at 09:36

## 2024-01-22 RX ADMIN — LIDOCAINE HYDROCHLORIDE 60 MG: 20 INJECTION, SOLUTION INFILTRATION; PERINEURAL at 09:26

## 2024-01-22 RX ADMIN — ROCURONIUM BROMIDE 30 MG: 50 INJECTION, SOLUTION INTRAVENOUS at 09:26

## 2024-01-22 RX ADMIN — PROPOFOL 140 MG: 10 INJECTION, EMULSION INTRAVENOUS at 09:26

## 2024-01-22 ASSESSMENT — ACTIVITIES OF DAILY LIVING (ADL)
ADLS_ACUITY_SCORE: 35
ADLS_ACUITY_SCORE: 35

## 2024-01-22 NOTE — ANESTHESIA CARE TRANSFER NOTE
Patient: Madeline Person    Procedure: Procedure(s):  ESOPHAGOGASTRODUODENOSCOPY, WITH BIOPSY       Diagnosis: Regurgitation and rechewing [R11.10]  Diagnosis Additional Information: No value filed.    Anesthesia Type:   General     Note:    Oropharynx: oropharynx clear of all foreign objects and spontaneously breathing  Level of Consciousness: awake  Oxygen Supplementation: face mask  Level of Supplemental Oxygen (L/min / FiO2): 6  Independent Airway: airway patency satisfactory and stable  Dentition: dentition unchanged  Vital Signs Stable: post-procedure vital signs reviewed and stable  Report to RN Given: handoff report given  Patient transferred to: PACU  Comments: Patient comfortable  Handoff Report: Identifed the Patient, Identified the Reponsible Provider, Reviewed the pertinent medical history, Discussed the surgical course, Reviewed Intra-OP anesthesia mangement and issues during anesthesia, Set expectations for post-procedure period and Allowed opportunity for questions and acknowledgement of understanding      Vitals:  Vitals Value Taken Time   BP     Temp     Pulse 114 01/22/24 0954   Resp 15 01/22/24 0954   SpO2 99 % 01/22/24 0954   Vitals shown include unfiled device data.    Electronically Signed By: JOSE FRANCISCO Martinez CRNA  January 22, 2024  9:55 AM

## 2024-01-22 NOTE — ANESTHESIA POSTPROCEDURE EVALUATION
Patient: Madeline Person    Procedure: Procedure(s):  ESOPHAGOGASTRODUODENOSCOPY, WITH BIOPSY       Anesthesia Type:  General    Note:  Disposition: Outpatient   Postop Pain Control: Uneventful            Sign Out: Well controlled pain   PONV: No   Neuro/Psych: Uneventful            Sign Out: Acceptable/Baseline neuro status   Airway/Respiratory: Uneventful            Sign Out: Acceptable/Baseline resp. status   CV/Hemodynamics: Uneventful            Sign Out: Acceptable CV status; No obvious hypovolemia; No obvious fluid overload   Other NRE: NONE   DID A NON-ROUTINE EVENT OCCUR? No           Last vitals:  Vitals Value Taken Time   /83 01/22/24 1015   Temp 36.8  C (98.2  F) 01/22/24 1015   Pulse 101 01/22/24 1025   Resp 36 01/22/24 1025   SpO2 100 % 01/22/24 1025   Vitals shown include unfiled device data.    Electronically Signed By: SHELIA REMY MD  January 22, 2024  1:01 PM

## 2024-01-22 NOTE — ANESTHESIA PREPROCEDURE EVALUATION
Anesthesia Pre-Procedure Evaluation    Patient: Madeline Person   MRN: [id number] : 1990        Procedure : Procedure(s):  ESOPHAGOGASTRODUODENOSCOPY          Past Medical History:   Diagnosis Date    Cerebral palsy (H)     Infantile spasms (H) 2023    Spastic quadriplegia (H)       Past Surgical History:   Procedure Laterality Date    ------------OTHER-------------      Baclofen Pump Insertion    ------------OTHER-------------      bilateral femur osteotomy    EXAM UNDER ANESTHESIA DENTAL, RESTORATION N/A 2020    Procedure: Dental Exam, Radiographs, Periodontal Therapy, flouride varnish;  Surgeon: Blair Borges DDS;  [facility]: [facility]    EXAM UNDER ANESTHESIA, RESTORATIONS, EXTRACTION(S) DENTAL COMPLEX, COMBINED N/A 2015    Procedure: COMBINED EXAM UNDER ANESTHESIA, RESTORATIONS, EXTRACTION(S) DENTAL COMPLEX;  Surgeon: Angelica Rubin DDS;  [facility]    EXAM UNDER ANESTHESIA, RESTORATIONS, EXTRACTION(S) DENTAL COMPLEX, COMBINED N/A 2023    Procedure: Bilateral dental exam, radiographs, periodontal therapy, fluoride varnish in the mouth;  Surgeon: Pawel Beckham DDS;  Location: [facility]    EXTRACTION(S) DENTAL N/A 2023    Procedure: Extraction of #17, 32;  Surgeon: Pawel Beckham DDS;  Location: [facility]    FOOT SURGERY      ORTHOPEDIC SURGERY        Allergies   Allergen Reactions    Latex      Other reaction(s): Unknown    Seasonal      Other reaction(s): Unknown      Social History     Tobacco Use    Smoking status: Never     Passive exposure: Never    Smokeless tobacco: Never   Substance Use Topics    Alcohol use: No      Wt Readings from Last 1 Encounters:   24 68.3 kg (150 lb 8 oz)        Anesthesia Evaluation            ROS/MED HX  ENT/Pulmonary:    (-) sleep apnea   Neurologic: Comment: Spastic quadriplegia; cerebral palsy; wheelchair dependent;    (+)                         Developmental delay,       Cardiovascular:       METS/Exercise Tolerance:      Hematologic:       Musculoskeletal:       GI/Hepatic:     (+) GERD,                   Renal/Genitourinary:       Endo:       Psychiatric/Substance Use:       Infectious Disease:       Malignancy:       Other:            Physical Exam    Airway        Mallampati: III   TM distance: > 3 FB   Neck ROM: full   Mouth opening: > 3 cm    Respiratory Devices and Support         Dental       (+) Minor Abnormalities - some fillings, tiny chips      Cardiovascular   cardiovascular exam normal          Pulmonary   pulmonary exam normal                OUTSIDE LABS:  CBC:   Lab Results   Component Value Date    WBC 6.7 01/22/2020    WBC 12.8 (H) 07/04/2010    HGB 13.2 01/22/2020    HGB 13.6 05/14/2019    HCT 39.8 01/22/2020    HCT 39.6 07/04/2010     01/22/2020     07/04/2010     BMP:   Lab Results   Component Value Date     01/15/2024     (H) 12/06/2023    POTASSIUM 3.9 01/15/2024    POTASSIUM 4.8 12/06/2023    CHLORIDE 105 01/15/2024    CHLORIDE 109 (H) 12/06/2023    CO2 25 01/15/2024    CO2 26 12/06/2023    BUN 10.8 01/15/2024    BUN 13.3 12/06/2023    CR 0.51 01/15/2024    CR 0.55 12/06/2023    GLC 96 01/15/2024    GLC 89 12/06/2023     COAGS:   Lab Results   Component Value Date    PTT 36 01/22/2020    INR 1.07 01/22/2020     POC:   Lab Results   Component Value Date    HCG Negative 02/05/2020    HCGS Negative 01/22/2020     HEPATIC:   Lab Results   Component Value Date    ALBUMIN 4.4 12/06/2023    PROTTOTAL 7.9 12/06/2023    ALT 23 12/06/2023    AST 28 12/06/2023    ALKPHOS 76 12/06/2023    BILITOTAL 0.2 12/06/2023     OTHER:   Lab Results   Component Value Date    GRANT 9.7 01/15/2024    TSH 1.78 05/14/2019       Anesthesia Plan    ASA Status:  3    NPO Status:  NPO Appropriate    Anesthesia Type: General.     - Airway: ETT   Induction: Intravenous.   Maintenance: Balanced.        Consents    Anesthesia Plan(s) and associated risks, benefits, and realistic alternatives discussed. Questions  answered and patient/representative(s) expressed understanding.     - Discussed:     - Discussed with:  Patient, Parent (Mother and/or Father)            Postoperative Care    Pain management: IV analgesics.   PONV prophylaxis: Ondansetron (or other 5HT-3), Background Propofol Infusion     Comments:               SHELIA REMY MD    I have reviewed the pertinent notes and labs in the chart from the past 30 days and (re)examined the patient.  Any updates or changes from those notes are reflected in this note.              # Overweight: Estimated body mass index is 29.39 kg/m  as calculated from the following:    Height as of 1/9/24: 1.524 m (5').    Weight as of 1/9/24: 68.3 kg (150 lb 8 oz).

## 2024-01-22 NOTE — ANESTHESIA PROCEDURE NOTES
Airway       Patient location during procedure: OR       Procedure Start/Stop Times: 1/22/2024 9:30 AM  Staff -        CRNA: Clau Hummel APRN CRNA       Performed By: CRNA  Consent for Airway        Urgency: elective  Indications and Patient Condition       Indications for airway management: low-procedural       Induction type:intravenous       Mask difficulty assessment: 1 - vent by mask    Final Airway Details       Final airway type: endotracheal airway       Successful airway: ETT - single  Endotracheal Airway Details        ETT size (mm): 7.0       Cuffed: yes       Successful intubation technique: video laryngoscopy       VL Blade Size: Kong 3       Grade View of Cords: 1       Adjucts: stylet       Position: Right       Measured from: gums/teeth       Secured at (cm): 20       Bite block used: None    Post intubation assessment        Placement verified by: capnometry, equal breath sounds and chest rise        Number of attempts at approach: 1       Number of other approaches attempted: 0       Secured with: tape       Ease of procedure: easy       Dentition: Intact and Unchanged    Medication(s) Administered   Medication Administration Time: 1/22/2024 9:30 AM

## 2024-01-24 LAB
PATH REPORT.COMMENTS IMP SPEC: NORMAL
PATH REPORT.FINAL DX SPEC: NORMAL
PATH REPORT.GROSS SPEC: NORMAL
PATH REPORT.MICROSCOPIC SPEC OTHER STN: NORMAL
PATH REPORT.RELEVANT HX SPEC: NORMAL
PHOTO IMAGE: NORMAL

## 2024-04-09 ENCOUNTER — TELEPHONE (OUTPATIENT)
Dept: FAMILY MEDICINE | Facility: CLINIC | Age: 34
End: 2024-04-09
Payer: COMMERCIAL

## 2024-04-09 NOTE — TELEPHONE ENCOUNTER
I'm not sure if we can 'order' a ceilling lift for short term rental for her but if she just needs an order for this and knows where to submit, I am happy to place this.    Otherwise, does need face-to-face appointment for this DME item to be obtained.    Please let her know.    Lamberto Mills MD  [facility], [place]  4/9/2024

## 2024-04-09 NOTE — TELEPHONE ENCOUNTER
Reason for Call: Request for an order or referral:    Order or referral being requested: Ceiling lift     Date needed: as soon as possible    Has the patient been seen by the PCP for this problem? Not Applicable    Additional comments: Patient is moving and needs to rent a ceiling lift ordered by provider. Closing house on 4/26/24, needs order asap.    Phone number Patient can be reached at:  Cell number on file:    Telephone Information:   Mobile [phone]       Best Time:  anytime    Can we leave a detailed message on this number?  YES    Call taken on 4/9/2024 at 4:03 PM by Viji Starks

## 2024-04-09 NOTE — TELEPHONE ENCOUNTER
Mom states she is looking for temporary portable lift until they modify their home.   Mom will call around for more info and call us back with an update.   Will consider F2F appt if needed.     Charo Wagner RN

## 2024-04-10 ENCOUNTER — TELEPHONE (OUTPATIENT)
Dept: FAMILY MEDICINE | Facility: CLINIC | Age: 34
End: 2024-04-10
Payer: COMMERCIAL

## 2024-04-10 ENCOUNTER — MYC MEDICAL ADVICE (OUTPATIENT)
Dept: FAMILY MEDICINE | Facility: CLINIC | Age: 34
End: 2024-04-10
Payer: COMMERCIAL

## 2024-04-10 NOTE — TELEPHONE ENCOUNTER
Rec'd - Medical equipment: Bath and electric standing lift  from Parent/Guardian of Pt. Placed in PCP basket for review and signature. Call the Parent/Guardian at: [phone].    **[employer] [place] Letter**      Cami Freeman

## 2024-04-11 NOTE — TELEPHONE ENCOUNTER
Spoke with the mother of the Pt in regards to scheduling a face-to-face visit to discuss the DME order.     Cami Freeman

## 2024-04-11 NOTE — TELEPHONE ENCOUNTER
Reviewed [employer] letter.    Looks like this will need face-to-face for DME.    I have opening tomorrow on 4/12/24 at 11:10 AM (currently being held for patient). Please call and ask if this works for them.     Thanks,    Lamberto Mills MD  [facility], [place]  4/11/2024

## 2024-04-11 NOTE — TELEPHONE ENCOUNTER
Two encounters for same concern.    See other MyChart encounter. Closing this out.    Lamberto Mills MD  [facility], [place]  4/11/2024

## 2024-04-11 NOTE — TELEPHONE ENCOUNTER
EDWARDM requesting a call back for an appt. Two more attempts will be made.    Fatemeh Araujo  Lead   [facility] [place]

## 2024-04-11 NOTE — TELEPHONE ENCOUNTER
Sent [employer] message requesting a call back for an appt. One more attempt will be made.     Fatemeh Araujo  Lead   [facility]

## 2024-04-12 ENCOUNTER — OFFICE VISIT (OUTPATIENT)
Dept: FAMILY MEDICINE | Facility: CLINIC | Age: 34
End: 2024-04-12
Payer: COMMERCIAL

## 2024-04-12 VITALS
TEMPERATURE: 98.2 F | HEART RATE: 69 BPM | SYSTOLIC BLOOD PRESSURE: 127 MMHG | DIASTOLIC BLOOD PRESSURE: 71 MMHG | RESPIRATION RATE: 24 BRPM | WEIGHT: 148.2 LBS | HEIGHT: 60 IN | BODY MASS INDEX: 29.09 KG/M2 | OXYGEN SATURATION: 97 %

## 2024-04-12 DIAGNOSIS — Z99.3 WHEELCHAIR DEPENDENT: ICD-10-CM

## 2024-04-12 DIAGNOSIS — G80.0 SPASTIC QUADRIPLEGIC CEREBRAL PALSY (H): ICD-10-CM

## 2024-04-12 DIAGNOSIS — R62.50 DEVELOPMENTAL DELAY: ICD-10-CM

## 2024-04-12 DIAGNOSIS — Z02.89 ENCOUNTER FOR COMPLETION OF FORM WITH PATIENT: Primary | ICD-10-CM

## 2024-04-12 PROCEDURE — 99214 OFFICE O/P EST MOD 30 MIN: CPT | Performed by: STUDENT IN AN ORGANIZED HEALTH CARE EDUCATION/TRAINING PROGRAM

## 2024-04-12 NOTE — PROGRESS NOTES
Assessment & Plan     Encounter for completion of form with patient  Spastic quadriplegic cerebral palsy (H)  Developmental delay  Wheelchair dependent  The patient requires a jessica lift for all transfers from bed to commode, commode to bed and bed to wheelchair. Otherwise the patient would be bed confined without the lift.    See HPI for more information on items requested.  - Miscellaneous Order for DME - (Use only if a more specific DME order does not already exist  - Miscellaneous Order for DME - (Use only if a more specific DME order does not already exist  - Miscellaneous Order for DME - (Use only if a more specific DME order does not already exist    BMI  Estimated body mass index is 28.94 kg/m  as calculated from the following:    Height as of this encounter: 1.524 m (5').    Weight as of this encounter: 67.2 kg (148 lb 3.2 oz).     Follow up in 9 months for annual physical    Lamberto Mills MD  [facility]  4/12/2024      Markus Valle is a 34 year old, presenting for the following health issues:  No chief complaint on file.        4/12/2024    11:17 AM   Additional Questions   Roomed by Eden DUGAN MA   Accompanied by Mom Dahlia         4/12/2024    11:17 AM   Patient Reported Additional Medications   Patient reports taking the following new medications none     History of Present Illness       Reason for visit:  Perscription is needed    She eats 2-3 servings of fruits and vegetables daily.She consumes 1 sweetened beverage(s) daily.She exercises with enough effort to increase her heart rate 10 to 19 minutes per day.  She exercises with enough effort to increase her heart rate 3 or less days per week.   She is taking medications regularly.     Is moving to new home and is in the process of setting of permanent modifications  In the meantime would like to rent the electric powered lift but purchase the bath seat lift.  Bathroom to be modified but currently unavailable as it will be under  construction until this is completed.  Not sure about the timeframe until this is completed (thinking maybe 10-12 months)  Needs the electric lift for all transfers into bed, bathroom, etc.  Patient's caregiver is 62 yrs old w/ low back pain, foot arthritis that will make it difficult to use non-electric lift.     The patient requires a jessica lift for all transfers from bed to commode, commode to bed and bed to wheelchair. Otherwise the patient would be bed confined without the lift     [employer] 450 battery  Electric powered lift    Bath seat lift  Drive [employer] Bath[employer]    Would like items to go to:  [place] [address]  P [phone]  Fax is [phone]    Just purchased a commode with removable arms        Objective    /71 (BP Location: Right arm, Patient Position: Sitting, Cuff Size: Adult Large)   Pulse 69   Temp 98.2  F (36.8  C) (Tympanic)   Resp 24   Ht 1.524 m (5')   Wt 67.2 kg (148 lb 3.2 oz)   LMP 03/28/2024 (Approximate)   SpO2 97%   BMI 28.94 kg/m    Body mass index is 28.94 kg/m .    Physical Exam   GENERAL: healthy, alert and no distress  HEAD: Normocephalic, atraumatic.   EYES: Normal conjunctivae, sclera.   RESP: Normal respiratory effort.  MSK: no gross musculoskeletal defects noted.  SKIN: no suspicious lesions or rashes.  NEURO: CNII-XII grossly intact.   PSYCH: Groomed, dressed appropriately for weather.     Signed Electronically by: Lamberto Mills MD

## 2024-05-06 ENCOUNTER — TELEPHONE (OUTPATIENT)
Dept: FAMILY MEDICINE | Facility: CLINIC | Age: 34
End: 2024-05-06
Payer: COMMERCIAL

## 2024-05-06 DIAGNOSIS — G80.9 INFANTILE CEREBRAL PALSY (H): Primary | ICD-10-CM

## 2024-05-06 DIAGNOSIS — R25.2 SPASTICITY: ICD-10-CM

## 2024-05-06 DIAGNOSIS — Z99.3 WHEELCHAIR DEPENDENT: ICD-10-CM

## 2024-05-06 DIAGNOSIS — G80.0 SPASTIC QUADRIPLEGIC CEREBRAL PALSY (H): ICD-10-CM

## 2024-05-06 NOTE — TELEPHONE ENCOUNTER
"Dr. Orantes,    Pt seen for OV 4/12/24. Joelle with [facility] calling with updates in regards to recent DME order for jessica lift. She states that the pt would like the lift billed through insurance. She reports that the way the order is currently written, does not follow insurance criteria.     Criteria required by pt insurance as follows (can addend order if appropriate):  - \"Pt requires a jessica lift for all transfers from bed to commode, commode to bed, and/or bed to wheelchair. Otherwise, the patient will be bed confined.\" (Needs to be exact).     Joelle states insurance does not cover non-standard equipment. Even though provider requests full electric lift, insurance will only cover standard equipment. Joelle states she will also call and inform the patient of this.    Joelle requests that the updated DME be sent to fax #: [phone] with attn to Joelle.    Pleased review and advise, thanks!  Kenrick Singh RN on 5/6/2024 at 11:32 AM        "

## 2024-05-07 NOTE — TELEPHONE ENCOUNTER
Routing to Triage to blade- up order. Then please route back to me.     Thanks,    Lamberto Mills MD  [facility]  5/7/2024

## 2024-05-07 NOTE — TELEPHONE ENCOUNTER
Ordered DME item.     Signed, in out basket.     Please fax.    Lamberto Mills MD  [facility], [place]  5/7/2024

## 2024-05-07 NOTE — TELEPHONE ENCOUNTER
Will forward to Dr. Mills - does she need a full body sling?  Is this a lifetime need?  Order blade'd up.  Edit as needed.

## 2024-05-07 NOTE — TELEPHONE ENCOUNTER
"Mother calling to request an addendum be made to the note from 4/12/24 to reflect the following:  \"The patient requires a jessica lift for all transfers from bed to commode, commode to bed and bed to wheelchair. Otherwise the patient would be bed confined without the lift\". Once the note is fixed mother is requesting the updated progress note be faxed to [facility] at [phone] Attn: Gali    Mother call back: [phone] (mother would like a call with an update once completed)    Lucretia Freeman     "

## 2024-05-08 NOTE — TELEPHONE ENCOUNTER
Received a call from the pt's mother and Joelle w/ [facility] stating that the addendum needs to be on the Office visit notes NOT the RX. Please addend and refax OFFICE VISIT NOTES to the fax number listed below     Lucretia Freeman

## 2024-05-09 NOTE — TELEPHONE ENCOUNTER
Addended the office visit note from 4/12/24.    Please fax.    Lamberto Mills MD  [facility], [place]  5/9/2024

## 2024-08-12 ENCOUNTER — TELEPHONE (OUTPATIENT)
Dept: FAMILY MEDICINE | Facility: CLINIC | Age: 34
End: 2024-08-12
Payer: COMMERCIAL

## 2024-08-12 NOTE — TELEPHONE ENCOUNTER
Forms/Letter Request    Type of form/letter: DME (wheelchair, hospital bed)    Type of DME requested: Urinary Incontinence    Do we have the form/letter: Yes: Prescription/Certificate of Medical Necessity    Who is the form from? [employer] (if other please explain)    Where did/will the form come from? form was faxed in    When is form/letter needed by: ASAP    How would you like the form/letter returned: Fax : [phone]    **Form placed in provider's basket for review and signature**    Fatemeh Araujo  Lead   [facility]

## 2024-08-13 ENCOUNTER — MEDICAL CORRESPONDENCE (OUTPATIENT)
Dept: HEALTH INFORMATION MANAGEMENT | Facility: CLINIC | Age: 34
End: 2024-08-13

## 2024-08-13 NOTE — TELEPHONE ENCOUNTER
Signed. Placed in outgoing box.    Lamberto Mills MD  [facility], [place]  8/13/2024

## 2024-10-31 ENCOUNTER — TELEPHONE (OUTPATIENT)
Dept: FAMILY MEDICINE | Facility: CLINIC | Age: 34
End: 2024-10-31
Payer: COMMERCIAL

## 2024-10-31 NOTE — TELEPHONE ENCOUNTER
Patient Quality Outreach    Patient is due for the following:   Cervical Cancer Screening - PAP Needed  Physical Preventive Adult Physical    Next Steps:   Schedule a Adult Preventative    Type of outreach:    Sent "[employer] message.      Questions for provider review:    None           Anna Delgado

## 2025-01-13 ENCOUNTER — NURSE TRIAGE (OUTPATIENT)
Dept: FAMILY MEDICINE | Facility: CLINIC | Age: 35
End: 2025-01-13

## 2025-01-13 ENCOUNTER — OFFICE VISIT (OUTPATIENT)
Dept: FAMILY MEDICINE | Facility: CLINIC | Age: 35
End: 2025-01-13
Payer: COMMERCIAL

## 2025-01-13 VITALS
RESPIRATION RATE: 21 BRPM | HEART RATE: 110 BPM | DIASTOLIC BLOOD PRESSURE: 70 MMHG | TEMPERATURE: 98.9 F | OXYGEN SATURATION: 100 % | SYSTOLIC BLOOD PRESSURE: 120 MMHG

## 2025-01-13 DIAGNOSIS — R32 URINARY INCONTINENCE, UNSPECIFIED TYPE: ICD-10-CM

## 2025-01-13 DIAGNOSIS — J01.00 ACUTE MAXILLARY SINUSITIS, RECURRENCE NOT SPECIFIED: Primary | ICD-10-CM

## 2025-01-13 DIAGNOSIS — R05.2 SUBACUTE COUGH: ICD-10-CM

## 2025-01-13 LAB
ALBUMIN UR-MCNC: NEGATIVE MG/DL
APPEARANCE UR: ABNORMAL
BACTERIA #/AREA URNS HPF: ABNORMAL /HPF
BILIRUB UR QL STRIP: NEGATIVE
COLOR UR AUTO: YELLOW
FLUAV AG SPEC QL IA: NEGATIVE
FLUBV AG SPEC QL IA: NEGATIVE
GLUCOSE UR STRIP-MCNC: NEGATIVE MG/DL
HGB UR QL STRIP: ABNORMAL
KETONES UR STRIP-MCNC: 15 MG/DL
LEUKOCYTE ESTERASE UR QL STRIP: NEGATIVE
MUCOUS THREADS #/AREA URNS LPF: PRESENT /LPF
NITRATE UR QL: NEGATIVE
PH UR STRIP: 5 [PH] (ref 5–7)
RBC #/AREA URNS AUTO: ABNORMAL /HPF
SP GR UR STRIP: >=1.03 (ref 1–1.03)
SQUAMOUS #/AREA URNS AUTO: ABNORMAL /LPF
UROBILINOGEN UR STRIP-ACNC: 0.2 E.U./DL
WBC #/AREA URNS AUTO: ABNORMAL /HPF
WBC CASTS #/AREA URNS LPF: ABNORMAL /LPF

## 2025-01-13 PROCEDURE — 87804 INFLUENZA ASSAY W/OPTIC: CPT | Performed by: NURSE PRACTITIONER

## 2025-01-13 PROCEDURE — G2211 COMPLEX E/M VISIT ADD ON: HCPCS | Performed by: NURSE PRACTITIONER

## 2025-01-13 PROCEDURE — 81001 URINALYSIS AUTO W/SCOPE: CPT | Performed by: NURSE PRACTITIONER

## 2025-01-13 PROCEDURE — 99214 OFFICE O/P EST MOD 30 MIN: CPT | Performed by: NURSE PRACTITIONER

## 2025-01-13 PROCEDURE — 87635 SARS-COV-2 COVID-19 AMP PRB: CPT | Performed by: NURSE PRACTITIONER

## 2025-01-13 RX ORDER — AMOXICILLIN AND CLAVULANATE POTASSIUM 400; 57 MG/5ML; MG/5ML
POWDER, FOR SUSPENSION ORAL
Qty: 220 ML | Refills: 0 | Status: SHIPPED | OUTPATIENT
Start: 2025-01-13

## 2025-01-13 NOTE — TELEPHONE ENCOUNTER
Nurse Triage SBAR    Situation: Cough, congestion, fever    Background: Patient has cerebral palsy and is in a wheelchair. Symptoms started on 1/7/25.    Assessment: Patient has a cough, congestion, and fever that started on 1/7/25. Fever has gotten up to 100.8 temporally. Mucus is greenish. Patient unable to cough it up and out. Mom has given her ibuprofen to help with symptoms. Mom does not feel like patient is having chest pain or difficulty breathing. Denies wheezing.     Recommendation: Per symptoms, disposition is to be seen today in office. Mom agrees and appointment scheduled.     Jan 13, 2025 1:30 PM  (Arrive by 1:10 PM)  Provider Visit with JOSE FRANCISCO Andersen CNP  [facility] ([facility] ) [phone]     Eliza Lopez RN 1/13/2025 8:33 AM  [facility]    Reason for Disposition   Fever > 100 F (37.8 C) and bedridden (e.g., CVA, chronic illness, recovering from surgery)    Additional Information   Negative: Bluish (or gray) lips or face   Negative: SEVERE difficulty breathing (e.g., struggling for each breath, speaks in single words)   Negative: Rapid onset of cough and has hives   Negative: Coughing started suddenly after medicine, an allergic food or bee sting   Negative: Difficulty breathing after exposure to flames, smoke, or fumes   Negative: Sounds like a life-threatening emergency to the triager   Negative: Previous asthma attacks and this feels like asthma attack   Negative: Dry cough (non-productive; no sputum or minimal clear sputum) and within 14 days of COVID-19 Exposure   Negative: MODERATE difficulty breathing (e.g., speaks in phrases, SOB even at rest, pulse 100-120) and still present when not coughing   Negative: Chest pain present when not coughing   Negative: Passed out (e.g., fainted, lost consciousness, blacked out and was not responding)   Negative: Patient sounds very sick or weak to the triager    "Negative: MILD difficulty breathing (e.g., minimal/no SOB at rest, SOB with walking, pulse <100) and still present when not coughing   Negative: Coughed up > 1 tablespoon (15 ml) blood (Exception: Blood-tinged sputum.)   Negative: Fever > 103 F (39.4 C)   Negative: Fever > 101 F (38.3 C) and over 60 years of age   Negative: Fever > 100 F (37.8 C) and has diabetes mellitus or a weak immune system (e.g., HIV positive, cancer chemotherapy, organ transplant, splenectomy, chronic steroids)    Answer Assessment - Initial Assessment Questions  1. ONSET: \"When did the cough begin?\"       1/7/25  2. SEVERITY: \"How bad is the cough today?\"       Mild- more when it starts   3. SPUTUM: \"Describe the color of your sputum\" (e.g., none, dry cough; clear, white, yellow, green)      Green drainage  4. HEMOPTYSIS: \"Are you coughing up any blood?\" If Yes, ask: \"How much?\" (e.g., flecks, streaks, tablespoons, etc.)      Denies   5. DIFFICULTY BREATHING: \"Are you having difficulty breathing?\" If Yes, ask: \"How bad is it?\" (e.g., mild, moderate, severe)       Denies   6. FEVER: \"Do you have a fever?\" If Yes, ask: \"What is your temperature, how was it measured, and when did it start?\"      Yes- 100.8- ibuprofen   7. CARDIAC HISTORY: \"Do you have any history of heart disease?\" (e.g., heart attack, congestive heart failure)       No  8. LUNG HISTORY: \"Do you have any history of lung disease?\"  (e.g., pulmonary embolus, asthma, emphysema)      No  9. PE RISK FACTORS: \"Do you have a history of blood clots?\" (or: recent major surgery, recent prolonged travel, bedridden)      Wheelchair due to CP  10. OTHER SYMPTOMS: \"Do you have any other symptoms?\" (e.g., runny nose, wheezing, chest pain)        Reports congestion, cough. Denies wheezing and chest pain.  11. PREGNANCY: \"Is there any chance you are pregnant?\" \"When was your last menstrual period?\"        Denies   12. TRAVEL: \"Have you traveled out of the country in the last month?\" (e.g., " travel history, exposures)        Denies    Protocols used: Cough-A-OH

## 2025-01-13 NOTE — PATIENT INSTRUCTIONS
Take meds as prescribed.   Get urine test.    Return to clinic if any changes or worsening symptoms.   Needs to be at home until fever free for 24 hours.    Will notify you of covid lab and further instructions.

## 2025-01-13 NOTE — PROGRESS NOTES
Assessment & Plan   (J01.00) Acute maxillary sinusitis, recurrence not specified  (primary encounter diagnosis)  Comment: Will treat with Augmentin which should give good coverage for sinus and chest congestion.   Plan: amoxicillin-clavulanate (AUGMENTIN) 400-57         MG/5ML suspension    (R05.9) Cough  Comment: Persistent cough.  Continue to monitor.  Should improve once sinus infection starts to clear.   Plan: COVID-19 Virus (Coronavirus) by PCR Nose,         Influenza A & B Antigen - Clinic Collect    (R32) Urinary incontinence, unspecified type  Comment: Will check for infection since few episodes on urinary incontinence.    Plan: UA Macroscopic with reflex to Microscopic and         Culture, UA Microscopic with Reflex to Culture         Patient Instructions   Take meds as prescribed.   Get urine test.    Return to clinic if any changes or worsening symptoms.   Needs to be at home until fever free for 24 hours.    Will notify you of covid lab and further instructions.        The longitudinal plan of care for the diagnosis(es)/condition(s) as documented were addressed during this visit. Due to the added complexity in care, I will continue to support Leonard in the subsequent management and with ongoing continuity of care.      BMI  Estimated body mass index is 28.94 kg/m  as calculated from the following:    Height as of 4/12/24: 1.524 m (5').    Weight as of 4/12/24: 67.2 kg (148 lb 3.2 oz).   Weight management plan: Discussed healthy diet and exercise guidelines        Markus Valle is a 34 year old, presenting for the following health issues:\  URI (X wk) and Cough  Here with mother.     History of Present Illness       Reason for visit:  Cough congestion fever  Symptom onset:  3-7 days ago  Symptoms include:  Fever cough congestion green drainage  Symptom progression:  Staying the same  Had these symptoms before:  Yes  Has tried/received treatment for these symptoms:  Yes  Previous treatment was  successful:  Yes  Prior treatment description:  Antibiotics decongestant  What makes it worse:  Drinking milk?  What makes it better:  ??   She is taking medications regularly.     Symptoms for the past 6 days.  Cough is more prevalent.  Loose cough.  Fever of 99.0 - 100.8 forehead temp.  Mother gave Ibuprofen as needed.  She has been eating and drinking well.  Seems to be a little more tired.      She did have 3 episodes on urinary incontinence over weekend.  Unsure if this is due to coughing or new infection.  Has been in underwear for the past 1-1.5 years.        Review of Systems  Constitutional, neuro, ENT, endocrine, pulmonary, cardiac, gastrointestinal, genitourinary, musculoskeletal, integument and psychiatric systems are negative, except as otherwise noted      Objective    /70 (BP Location: Right arm, Patient Position: Chair, Cuff Size: Adult Regular)   Pulse 110   Temp 98.9  F (37.2  C) (Axillary)   Resp 21   SpO2 100%   There is no height or weight on file to calculate BMI.  Physical Exam  Constitutional:       General: She is not in acute distress.     Appearance: Normal appearance. She is not ill-appearing.   HENT:      Head: Normocephalic.      Right Ear: Tympanic membrane and external ear normal. There is impacted cerumen.      Left Ear: Tympanic membrane and external ear normal. There is impacted cerumen.      Ears:      Comments: Only able part of right TM which looked normal.  Canals are clear; no redness; no drainage.       Nose: Congestion and rhinorrhea present.      Mouth/Throat:      Mouth: Mucous membranes are moist.      Pharynx: Oropharynx is clear. No oropharyngeal exudate or posterior oropharyngeal erythema.      Comments: Clear to cloudy post nasal drainage.    Eyes:      General: No scleral icterus.        Right eye: No discharge.         Left eye: No discharge.      Conjunctiva/sclera: Conjunctivae normal.   Neck:      Comments: Thyroid is normal shape and size and smooth.   No nodules or enlargement noted.  No pain noted.      Cardiovascular:      Rate and Rhythm: Normal rate and regular rhythm.      Heart sounds: Normal heart sounds. No murmur heard.     No friction rub. No gallop.   Pulmonary:      Effort: Pulmonary effort is normal. No respiratory distress.      Breath sounds: Normal breath sounds. No wheezing, rhonchi or rales.   Musculoskeletal:      Cervical back: Normal range of motion and neck supple. No rigidity or tenderness.   Lymphadenopathy:      Cervical: No cervical adenopathy.   Skin:     General: Skin is warm and dry.      Findings: No rash.   Neurological:      General: No focal deficit present.      Mental Status: She is alert.   Psychiatric:         Mood and Affect: Mood normal.         Behavior: Behavior normal.         Thought Content: Thought content normal.         Judgment: Judgment normal.             Signed Electronically by: JOSE FRANCISCO Andersen CNP

## 2025-01-14 LAB — SARS-COV-2 RNA RESP QL NAA+PROBE: NEGATIVE

## 2025-02-04 ENCOUNTER — TELEPHONE (OUTPATIENT)
Dept: FAMILY MEDICINE | Facility: CLINIC | Age: 35
End: 2025-02-04
Payer: COMMERCIAL

## 2025-02-04 NOTE — TELEPHONE ENCOUNTER
Forms/Letter Request    Type of form/letter: OTHER:    [employer]   Prescription / Certificate for Medical Necessity  Pull ups / Underpads / Liners    Do we have the form/letter: Yes:     Who is the form from? Home care    Where did/will the form come from? form was faxed in    When is form/letter needed by:     How would you like the form/letter returned: Fax : [phone]    Shweta Major Patient Representative

## 2025-02-04 NOTE — TELEPHONE ENCOUNTER
Placed form in provider's basket for review and signature.     Fatemeh Araujo  Lead   [facility]

## 2025-02-05 ENCOUNTER — MEDICAL CORRESPONDENCE (OUTPATIENT)
Dept: HEALTH INFORMATION MANAGEMENT | Facility: CLINIC | Age: 35
End: 2025-02-05

## 2025-02-05 NOTE — TELEPHONE ENCOUNTER
Signed. Placed in outgoing box.    Lamberto Mills MD  [facility], [place]  2/5/2025

## 2025-02-25 ENCOUNTER — TRANSFERRED RECORDS (OUTPATIENT)
Dept: HEALTH INFORMATION MANAGEMENT | Facility: CLINIC | Age: 35
End: 2025-02-25
Payer: COMMERCIAL

## 2025-03-04 ENCOUNTER — OFFICE VISIT (OUTPATIENT)
Dept: FAMILY MEDICINE | Facility: CLINIC | Age: 35
End: 2025-03-04
Payer: COMMERCIAL

## 2025-03-04 VITALS
DIASTOLIC BLOOD PRESSURE: 85 MMHG | SYSTOLIC BLOOD PRESSURE: 135 MMHG | OXYGEN SATURATION: 99 % | TEMPERATURE: 98 F | WEIGHT: 142 LBS | RESPIRATION RATE: 18 BRPM | BODY MASS INDEX: 27.88 KG/M2 | HEIGHT: 60 IN | HEART RATE: 88 BPM

## 2025-03-04 DIAGNOSIS — Z00.00 ROUTINE GENERAL MEDICAL EXAMINATION AT A HEALTH CARE FACILITY: Primary | ICD-10-CM

## 2025-03-04 DIAGNOSIS — K21.00 GASTROESOPHAGEAL REFLUX DISEASE WITH ESOPHAGITIS WITHOUT HEMORRHAGE: ICD-10-CM

## 2025-03-04 DIAGNOSIS — G80.0 SPASTIC QUADRIPLEGIC CEREBRAL PALSY (H): ICD-10-CM

## 2025-03-04 DIAGNOSIS — Z99.3 WHEELCHAIR DEPENDENT: ICD-10-CM

## 2025-03-04 DIAGNOSIS — K59.01 SLOW TRANSIT CONSTIPATION: ICD-10-CM

## 2025-03-04 LAB
ALBUMIN SERPL BCG-MCNC: 4.5 G/DL (ref 3.5–5.2)
ALP SERPL-CCNC: 62 U/L (ref 40–150)
ALT SERPL W P-5'-P-CCNC: 18 U/L (ref 0–50)
ANION GAP SERPL CALCULATED.3IONS-SCNC: 12 MMOL/L (ref 7–15)
AST SERPL W P-5'-P-CCNC: 21 U/L (ref 0–45)
BILIRUB SERPL-MCNC: 0.2 MG/DL
BUN SERPL-MCNC: 17.6 MG/DL (ref 6–20)
CALCIUM SERPL-MCNC: 9.6 MG/DL (ref 8.8–10.4)
CHLORIDE SERPL-SCNC: 105 MMOL/L (ref 98–107)
CHOLEST SERPL-MCNC: 154 MG/DL
CREAT SERPL-MCNC: 0.55 MG/DL (ref 0.51–0.95)
EGFRCR SERPLBLD CKD-EPI 2021: >90 ML/MIN/1.73M2
FASTING STATUS PATIENT QL REPORTED: YES
FASTING STATUS PATIENT QL REPORTED: YES
GLUCOSE SERPL-MCNC: 84 MG/DL (ref 70–99)
HCO3 SERPL-SCNC: 23 MMOL/L (ref 22–29)
HDLC SERPL-MCNC: 56 MG/DL
LDLC SERPL CALC-MCNC: 87 MG/DL
NONHDLC SERPL-MCNC: 98 MG/DL
POTASSIUM SERPL-SCNC: 4 MMOL/L (ref 3.4–5.3)
PROT SERPL-MCNC: 7.7 G/DL (ref 6.4–8.3)
SODIUM SERPL-SCNC: 140 MMOL/L (ref 135–145)
TRIGL SERPL-MCNC: 55 MG/DL
VIT D+METAB SERPL-MCNC: 36 NG/ML (ref 20–50)

## 2025-03-04 PROCEDURE — 80061 LIPID PANEL: CPT | Performed by: STUDENT IN AN ORGANIZED HEALTH CARE EDUCATION/TRAINING PROGRAM

## 2025-03-04 PROCEDURE — 82306 VITAMIN D 25 HYDROXY: CPT | Performed by: STUDENT IN AN ORGANIZED HEALTH CARE EDUCATION/TRAINING PROGRAM

## 2025-03-04 PROCEDURE — 80053 COMPREHEN METABOLIC PANEL: CPT | Performed by: STUDENT IN AN ORGANIZED HEALTH CARE EDUCATION/TRAINING PROGRAM

## 2025-03-04 PROCEDURE — 36415 COLL VENOUS BLD VENIPUNCTURE: CPT | Performed by: STUDENT IN AN ORGANIZED HEALTH CARE EDUCATION/TRAINING PROGRAM

## 2025-03-04 SDOH — HEALTH STABILITY: PHYSICAL HEALTH: ON AVERAGE, HOW MANY MINUTES DO YOU ENGAGE IN EXERCISE AT THIS LEVEL?: 10 MIN

## 2025-03-04 SDOH — HEALTH STABILITY: PHYSICAL HEALTH: ON AVERAGE, HOW MANY DAYS PER WEEK DO YOU ENGAGE IN MODERATE TO STRENUOUS EXERCISE (LIKE A BRISK WALK)?: 1 DAY

## 2025-03-04 ASSESSMENT — PAIN SCALES - GENERAL: PAINLEVEL_OUTOF10: NO PAIN (0)

## 2025-03-04 ASSESSMENT — SOCIAL DETERMINANTS OF HEALTH (SDOH): HOW OFTEN DO YOU GET TOGETHER WITH FRIENDS OR RELATIVES?: MORE THAN THREE TIMES A WEEK

## 2025-03-04 NOTE — PROGRESS NOTES
Preventive Care Visit  [facility]  Lamberto Mills MD, Family Medicine  Mar 4, 2025    Assessment & Plan     Routine general medical examination at a health care facility  Doing well overall. Due for fasting glucose and lipid, obtained. Due for pap smear, discussed consideration for separate appt to obtain. Discussed vaccination recommendations.    Spastic quadriplegic cerebral palsy (H)  Wheelchair dependent  Following with PM&R through [facility]. Reviewed last note. No longer seeing neurosurgery as baclofen pump removed. Stable.  - Comprehensive metabolic panel (BMP + Alb, Alk Phos, ALT, AST, Total. Bili, TP)  - Vitamin D Deficiency  - Lipid panel reflex to direct LDL Fasting    Gastroesophageal reflux disease with esophagitis without hemorrhage  Following with GI. On daily PPI with upcoming endoscopy scheduled.     Slow transit constipation  Following with GI. Discussed transitioning to daily Miralax instead of senna.     Counseling  Appropriate preventive services were discussed with this patient.  Checklist reviewing preventive services available has been given to the patient.    Follow up in few weeks for pre-op as already scheduled    Lamberto Mills MD  [facility]  3/4/2025    Markus Valle is a 34 year old, presenting for the following:  Physical (Pap due)        3/4/2025    10:15 AM   Additional Questions   Roomed by [facility]        Pt is fasting for labs.    Pap declined.     HPI    CP  Stopped seeing Dr. Black, as she retired.   Now setting up appt with new physiatrist, Dr. Obed Salinas through [facility].   No longer following with Neurosurgery since baclofen pump has been removed.  Now has ceiling lift finally, just came in last week.   Overall doing pretty well.      Constipation  Still kind of an issue.   Seeing GI, last through virtual visit.   Suggested not taking the senna and transitioning to miralax daily which they have been doing recently.   Hasn't  needed the dulcolax suppository in so long but would like to have this on hand in case it is needed.    GERD  Seeing GI.   Still taking the omeprazole 20-30 minutes prior to eating.   Is trying to elevate the HOB just a little bit.   Will be having an endoscopy on March 25th.       Advance Care Planning  Patient has a Health Care Directive on file  Advance care planning document is on file and is current.      3/4/2025   General Health   How would you rate your overall physical health? Good   Feel stress (tense, anxious, or unable to sleep) Not at all         3/4/2025   Nutrition   Three or more servings of calcium each day? Yes   Diet: Regular (no restrictions)   How many servings of fruit and vegetables per day? (!) 2-3   How many sweetened beverages each day? 0-1         3/4/2025   Exercise   Days per week of moderate/strenous exercise 1 day   Average minutes spent exercising at this level 10 min   (!) EXERCISE CONCERN        3/4/2025   Social Factors   Frequency of gathering with friends or relatives More than three times a week   Worry food won't last until get money to buy more No   Food not last or not have enough money for food? No   Do you have housing? (Housing is defined as stable permanent housing and does not include staying ouside in a car, in a tent, in an abandoned building, in an overnight shelter, or couch-surfing.) Yes   Are you worried about losing your housing? No   Lack of transportation? No   Unable to get utilities (heat,electricity)? No         3/4/2025   Dental   Dentist two times every year? Yes       Today's PHQ-2 Score:       3/4/2025    10:16 AM   PHQ-2 ( 1999 Pfizer)   PHQ-2 Score Incomplete           3/4/2025   Substance Use   Alcohol more than 3/day or more than 7/wk Not Applicable   Do you use any other substances recreationally? No     Social History     Tobacco Use    Smoking status: Never     Passive exposure: Never    Smokeless tobacco: Never   Vaping Use    Vaping status: Never  Used   Substance Use Topics    Alcohol use: No    Drug use: No         1/9/2024   LAST FHS-7 RESULTS   1st degree relative breast or ovarian cancer No   Any relative bilateral breast cancer No   Any male have breast cancer No   Any ONE woman have BOTH breast AND ovarian cancer No   Any woman with breast cancer before 50yrs Yes   2 or more relatives with breast AND/OR ovarian cancer Yes   2 or more relatives with breast AND/OR bowel cancer Yes   Grandmother and great grandmother with BC. Mother and mother's sister does not have BC    Mammogram screening: Patient under age of 40, not yet due.         3/4/2025   STI Screening   New sexual partner(s) since last STI/HIV test? No     History of abnormal Pap smear: Has not had pap smear, discussed recommendation for separate office visit to attempt as able.        3/4/2025   Contraception/Family Planning   Questions about contraception or family planning No     Reviewed and updated as needed this visit by Provider   Tobacco   Meds  Problems  Med Hx  Surg Hx  Fam Hx               Objective    Exam  /85 (BP Location: Right arm, Patient Position: Sitting, Cuff Size: Adult Large)   Pulse 88   Temp 98  F (36.7  C) (Oral)   Resp 18   Ht 1.524 m (5')   Wt 64.4 kg (142 lb)   SpO2 99%   BMI 27.73 kg/m     Estimated body mass index is 27.73 kg/m  as calculated from the following:    Height as of this encounter: 1.524 m (5').    Weight as of this encounter: 64.4 kg (142 lb).    Physical Exam  GENERAL: healthy, alert and no distress. Sitting comfortably in wheelchair.  HEAD: Normocephalic, atraumatic.   EYES: PERRL. Normal conjunctivae, sclera.   ENT: Normal EAC and TMs bilaterally. Normal oropharynx.   NECK: Supple. No lymphadenopathy appreciated. Trachea midline. Thyroid not enlarged, not TTP.  RESP: lungs clear to auscultation - no rales, rhonchi or wheezes  CV: regular rate and rhythm, normal S1 S2, no murmur, click, rub or gallop.  No peripheral swelling noted.    ABDOMEN: soft, no TTP x4 quadrants. No hepatomegaly or masses appreciated. BS normactive.  MSK: no gross musculoskeletal defects noted.  SKIN: no suspicious lesions or rashes.  EXT: Warm and well perfused. Leg braces in place.   NEURO: CNII-XII grossly intact. Spasticity present in extremities. No focal deficits.  PSYCH: Groomed, dressed appropriately for weather.  Logical, linear thought process. Normal mood with consistent affect.     Signed Electronically by: Lamberto Mills MD

## 2025-03-04 NOTE — PATIENT INSTRUCTIONS
Patient Education   Preventive Care Advice   This is general advice given by our system to help you stay healthy. However, your care team may have specific advice just for you. Please talk to your care team about your preventive care needs.  Nutrition  Eat 5 or more servings of fruits and vegetables each day.  Try wheat bread, brown rice and whole grain pasta (instead of white bread, rice, and pasta).  Get enough calcium and vitamin D. Check the label on foods and aim for 100% of the RDA (recommended daily allowance).  Lifestyle  Exercise at least 150 minutes each week  (30 minutes a day, 5 days a week).  Do muscle strengthening activities 2 days a week. These help control your weight and prevent disease.  No smoking.  Wear sunscreen to prevent skin cancer.  Have a dental exam and cleaning every 6 months.  Yearly exams  See your health care team every year to talk about:  Any changes in your health.  Any medicines your care team has prescribed.  Preventive care, family planning, and ways to prevent chronic diseases.  Shots (vaccines)   HPV shots (up to age 26), if you've never had them before.  Hepatitis B shots (up to age 59), if you've never had them before.  COVID-19 shot: Get this shot when it's due.  Flu shot: Get a flu shot every year.  Tetanus shot: Get a tetanus shot every 10 years.  Pneumococcal, hepatitis A, and RSV shots: Ask your care team if you need these based on your risk.  Shingles shot (for age 50 and up)  General health tests  Diabetes screening:  Starting at age 35, Get screened for diabetes at least every 3 years.  If you are younger than age 35, ask your care team if you should be screened for diabetes.  Cholesterol test: At age 39, start having a cholesterol test every 5 years, or more often if advised.  Bone density scan (DEXA): At age 50, ask your care team if you should have this scan for osteoporosis (brittle bones).  Hepatitis C: Get tested at least once in your life.  STIs (sexually  transmitted infections)  Before age 24: Ask your care team if you should be screened for STIs.  After age 24: Get screened for STIs if you're at risk. You are at risk for STIs (including HIV) if:  You are sexually active with more than one person.  You don't use condoms every time.  You or a partner was diagnosed with a sexually transmitted infection.  If you are at risk for HIV, ask about PrEP medicine to prevent HIV.  Get tested for HIV at least once in your life, whether you are at risk for HIV or not.  Cancer screening tests  Cervical cancer screening: If you have a cervix, begin getting regular cervical cancer screening tests starting at age 21.  Breast cancer scan (mammogram): If you've ever had breasts, begin having regular mammograms starting at age 40. This is a scan to check for breast cancer.  Colon cancer screening: It is important to start screening for colon cancer at age 45.  Have a colonoscopy test every 10 years (or more often if you're at risk) Or, ask your provider about stool tests like a FIT test every year or Cologuard test every 3 years.  To learn more about your testing options, visit:   .  For help making a decision, visit:   https://bit.ly/zg00055.  Prostate cancer screening test: If you have a prostate, ask your care team if a prostate cancer screening test (PSA) at age 55 is right for you.  Lung cancer screening: If you are a current or former smoker ages 50 to 80, ask your care team if ongoing lung cancer screenings are right for you.  For informational purposes only. Not to replace the advice of your health care provider. Copyright   2023 [employer]. All rights reserved. Clinically reviewed by the [facility]. [employer] 175036 - REV 01/24.

## 2025-03-10 ENCOUNTER — OFFICE VISIT (OUTPATIENT)
Dept: FAMILY MEDICINE | Facility: CLINIC | Age: 35
End: 2025-03-10
Payer: COMMERCIAL

## 2025-03-10 VITALS
HEART RATE: 106 BPM | RESPIRATION RATE: 18 BRPM | HEIGHT: 60 IN | SYSTOLIC BLOOD PRESSURE: 131 MMHG | OXYGEN SATURATION: 100 % | BODY MASS INDEX: 27.73 KG/M2 | DIASTOLIC BLOOD PRESSURE: 80 MMHG

## 2025-03-10 DIAGNOSIS — K59.01 SLOW TRANSIT CONSTIPATION: ICD-10-CM

## 2025-03-10 DIAGNOSIS — Z01.818 PRE-OP EXAM: Primary | ICD-10-CM

## 2025-03-10 DIAGNOSIS — K21.00 GASTROESOPHAGEAL REFLUX DISEASE WITH ESOPHAGITIS WITHOUT HEMORRHAGE: ICD-10-CM

## 2025-03-10 PROCEDURE — 3075F SYST BP GE 130 - 139MM HG: CPT | Performed by: STUDENT IN AN ORGANIZED HEALTH CARE EDUCATION/TRAINING PROGRAM

## 2025-03-10 PROCEDURE — G2211 COMPLEX E/M VISIT ADD ON: HCPCS | Performed by: STUDENT IN AN ORGANIZED HEALTH CARE EDUCATION/TRAINING PROGRAM

## 2025-03-10 PROCEDURE — 99213 OFFICE O/P EST LOW 20 MIN: CPT | Performed by: STUDENT IN AN ORGANIZED HEALTH CARE EDUCATION/TRAINING PROGRAM

## 2025-03-10 PROCEDURE — 3079F DIAST BP 80-89 MM HG: CPT | Performed by: STUDENT IN AN ORGANIZED HEALTH CARE EDUCATION/TRAINING PROGRAM

## 2025-03-10 NOTE — PROGRESS NOTES
Preoperative Evaluation  [facility]  [address] [facility] [address]  Phone: [phone]  Primary Provider: Lamberto Mills MD  Pre-op Performing Provider: Lamberto Mills MD  Mar 10, 2025         3/10/2025   Surgical Information   What procedure is being done? endoscopy under anesthesia   Facility or Hospital where procedure/surgery will be performed: [facility]   Who is doing the procedure / surgery? dr harvinder flores   Date of surgery / procedure: march 24   Time of surgery / procedure: 715arrival time procedure 815   Where do you plan to recover after surgery? at home with family     Fax number for surgical facility: Note does not need to be faxed, will be available electronically in Epic.    Assessment & Plan     The proposed surgical procedure is considered LOW risk.    Pre-op exam  Gastroesophageal reflux disease with esophagitis without hemorrhage    Slow transit constipation  On daily Miralax which is effective but causing minimal stool in underwear after BMs. Discussed consideration to alternate between daily Miralax and fiber supplement to bulk up stools a bit. Discussed using barrier cream around anal region if noticing irritation develop.      - No identified additional risk factors other than previously addressed    Antiplatelet or Anticoagulation Medication Instructions   - We reviewed the medication list and the patient is not on an antiplatelet or anticoagulation medications.    Additional Medication Instructions  Take all scheduled medications on the day of surgery EXCEPT for modifications listed below:  Hold all supplements 14 days prior to surgery  Hold all NSAIDs 7-10 days prior to surgery  Hold all topical creams, bowel regimen on day of surgery    Recommendation  Approval given to proceed with proposed procedure, without further diagnostic evaluation.    Follow up in one year for annual physical    Lamberto Mills MD  [facility],  [place]  3/10/2025    Markus Valle is a 34 year old, presenting for the following:  Pre-Op Exam        3/4/2025    10:15 AM   Additional Questions   Roomed by Donna DE   Accompanied by mom     HPI: Pre op for upcoming endoscopy         3/10/2025   Pre-Op Questionnaire   Have you ever had a heart attack or stroke? No   Have you ever had surgery on your heart or blood vessels, such as a stent placement, a coronary artery bypass, or surgery on an artery in your head, neck, heart, or legs? No   Do you have chest pain with activity? (!) UNKNOWN - unable to assess with underlying developmental delay    Do you have a history of heart failure? No   Do you currently have a cold, bronchitis or symptoms of other infection? No   Do you have a cough, shortness of breath, or wheezing? No   Do you or anyone in your family have previous history of blood clots? (!) YES - dad with cardiac arrest from massive MI after long period of immobility.   Do you or does anyone in your family have a serious bleeding problem such as prolonged bleeding following surgeries or cuts? No   Have you ever had problems with anemia or been told to take iron pills? (!) UNKNOWN, maybe an issue during childhood but nothing recently.   Have you had any abnormal blood loss such as black, tarry or bloody stools, or abnormal vaginal bleeding? No   Have you ever had a blood transfusion? (!) UNKNOWN, thinks she had one during  intensive care.   Are you willing to have a blood transfusion if it is medically needed before, during, or after your surgery? Yes   Have you or any of your relatives ever had problems with anesthesia? YES, maternal grandmother with ?allergic reaction with anesthesia (not life threatening), but patient has always done well under general anesthesia.    Do you have sleep apnea, excessive snoring or daytime drowsiness? No   Do you have any artifical heart valves or other implanted medical devices like a pacemaker,  defibrillator, or continuous glucose monitor? No   Do you have artificial joints? No   Are you allergic to latex? No     Health Care Directive  Patient has a Health Care Directive on file    Preoperative Review of    reviewed - no record of controlled substances prescribed.    Status of Chronic Conditions:  See problem list for active medical problems.  Problems all longstanding and stable, except as noted/documented.  See ROS for pertinent symptoms related to these conditions.    Constipation  Improved with daily Miralax use. Now with daily stools, snake-like and loose. Small, quarter size stool stain in underwear after stooling. Cleaning well after each wipe. No irritated skin surrounding anus.     Patient Active Problem List    Diagnosis Date Noted    Spastic quadriplegic cerebral palsy (H) 01/09/2024     Priority: Medium     Follows with [facility]/ [facility]. Sees Dr. Black.  Has power wheelchair.   Has shower chair, ceiling lift at home.   If needs supply, would be getting items through PCP.  S/p baclofen pump. Wasn't in the right place. Followed with neurosurgery who was ok with removal.  Spasticity managed well enough without any oral medications.        Developmental delay 12/07/2023     Priority: Medium    Spasticity 12/07/2023     Priority: Medium    Slow transit constipation 12/06/2023     Priority: Medium     Following with GI      Wheelchair dependent 12/06/2023     Priority: Medium    Gastroesophageal reflux disease with esophagitis without hemorrhage 12/06/2023     Priority: Medium     Following with GI. Grade C reflux esophagitis on EGD on 1/2024. Repeat EGD scheduled.      [employer] Paperwork completed.  01/06/2020     Priority: Medium    Infantile cerebral palsy (H) 02/06/2006     Priority: Medium     Epic        Past Medical History:   Diagnosis Date    Cerebral palsy (H)     Infantile spasms (H) 05/17/2023    PONV (postoperative nausea and vomiting)     Spastic quadriplegia (H)      Past  Surgical History:   Procedure Laterality Date    ------------OTHER-------------      Baclofen Pump Insertion    ------------OTHER-------------      bilateral femur osteotomy    ESOPHAGOSCOPY, GASTROSCOPY, DUODENOSCOPY (EGD), COMBINED N/A 1/22/2024    Procedure: ESOPHAGOGASTRODUODENOSCOPY, WITH BIOPSY;  Surgeon: George Pelayo MD;  Location: [facility]    EXAM UNDER ANESTHESIA DENTAL, RESTORATION N/A 1/8/2020    Procedure: Dental Exam, Radiographs, Periodontal Therapy, flouride varnish;  Surgeon: Blair Borges DDS;  Location: [facility]    EXAM UNDER ANESTHESIA, RESTORATIONS, EXTRACTION(S) DENTAL COMPLEX, COMBINED N/A 4/9/2015    Procedure: COMBINED EXAM UNDER ANESTHESIA, RESTORATIONS, EXTRACTION(S) DENTAL COMPLEX;  Surgeon: Angelica Rubin DDS;  Location: [facility]    EXAM UNDER ANESTHESIA, RESTORATIONS, EXTRACTION(S) DENTAL COMPLEX, COMBINED N/A 5/18/2023    Procedure: Bilateral dental exam, radiographs, periodontal therapy, fluoride varnish in the mouth;  Surgeon: Pawel Beckham DDS;  Location: [facility]    EXTRACTION(S) DENTAL N/A 5/18/2023    Procedure: Extraction of #17, 32;  Surgeon: Pawel Beckham DDS;  Location: [facility]    FOOT SURGERY      ORTHOPEDIC SURGERY       Current Outpatient Medications   Medication Sig Dispense Refill    Ascorbic Acid (VITAMIN C) 100 MG CHEW Take 1 chew tab by mouth daily      biotin 2.5 MG TABS Take 2 chew tab by mouth daily      bisacodyl (DULCOLAX) 10 MG suppository Place 1 suppository (10 mg) rectally daily as needed for constipation 30 suppository 6    cholecalciferol (VITAMIN D3) 5000 units (125 mcg) capsule Take 5,000 Units by mouth daily      Guar Gum (FIBER MODULAR, NUTRISOURCE FIBER,) packet Take 1 packet by mouth daily      Multiple Vitamins-Minerals (MULTIVITAMIN GUMMIES ADULT PO) Take 2 chew tab by mouth daily       omeprazole (PRILOSEC) 20 MG DR capsule Take 1 tablet by mouth daily at 2 pm      sennosides (SENOKOT) 8.6 MG tablet TAKE 2 TABLETS BY MOUTH DAILY 5  DAYS EVERY WEEK         Allergies   Allergen Reactions    Latex      Other reaction(s): Unknown    Seasonal      Other reaction(s): Unknown        Social History     Tobacco Use    Smoking status: Never     Passive exposure: Never    Smokeless tobacco: Never   Substance Use Topics    Alcohol use: No     History   Drug Use No           Objective    /80   Pulse 106   Resp 18   Ht 1.524 m (5')   SpO2 100%   BMI 27.73 kg/m     Estimated body mass index is 27.73 kg/m  as calculated from the following:    Height as of this encounter: 1.524 m (5').    Weight as of 3/4/25: 64.4 kg (142 lb).    Physical Exam  GENERAL: healthy, alert and no distress.  HEAD: Normocephalic, atraumatic.   EYES: Normal conjunctivae, sclera.   ENT: Normal oropharynx.   NECK: Supple. No lymphadenopathy appreciated.   RESP: lungs clear to auscultation - no rales, rhonchi or wheezes  CV: regular rate and rhythm, normal S1 S2, no murmur, click, rub or gallop.  No peripheral swelling noted.   ABDOMEN: soft, no TTP x4 quadrants.  BS normactive.  MSK: no gross musculoskeletal defects noted.  SKIN: no suspicious lesions or rashes.  EXT: Warm and well perfused.  NEURO: CNII-XII grossly intact. No focal deficits.  PSYCH: Groomed, dressed appropriately for weather.     Recent Labs   Lab Test 03/04/25  1131      POTASSIUM 4.0   CR 0.55      Diagnostics  No labs were ordered during this visit.   No EKG required for low risk surgery (cataract, skin procedure, breast biopsy, etc).    Revised Cardiac Risk Index (RCRI)  The patient has the following serious cardiovascular risks for perioperative complications:   - No serious cardiac risks = 0 points     RCRI Interpretation: 0 points: Class I (very low risk - 0.4% complication rate)    Signed Electronically by: Lamberto Mills MD  A copy of this evaluation report is provided to the requesting physician.

## 2025-03-24 ENCOUNTER — HOSPITAL ENCOUNTER (OUTPATIENT)
Facility: CLINIC | Age: 35
Discharge: HOME | End: 2025-03-24
Attending: INTERNAL MEDICINE | Admitting: INTERNAL MEDICINE
Payer: COMMERCIAL

## 2025-03-24 ENCOUNTER — ANESTHESIA (OUTPATIENT)
Dept: GASTROENTEROLOGY | Facility: CLINIC | Age: 35
End: 2025-03-24
Payer: COMMERCIAL

## 2025-03-24 ENCOUNTER — ANESTHESIA EVENT (OUTPATIENT)
Dept: GASTROENTEROLOGY | Facility: CLINIC | Age: 35
End: 2025-03-24
Payer: COMMERCIAL

## 2025-03-24 VITALS
DIASTOLIC BLOOD PRESSURE: 85 MMHG | HEART RATE: 95 BPM | RESPIRATION RATE: 17 BRPM | OXYGEN SATURATION: 99 % | TEMPERATURE: 98 F | SYSTOLIC BLOOD PRESSURE: 124 MMHG

## 2025-03-24 LAB — UPPER GI ENDOSCOPY: NORMAL

## 2025-03-24 PROCEDURE — 258N000003 HC RX IP 258 OP 636: Performed by: NURSE ANESTHETIST, CERTIFIED REGISTERED

## 2025-03-24 PROCEDURE — 88341 IMHCHEM/IMCYTCHM EA ADD ANTB: CPT | Mod: 26 | Performed by: PATHOLOGY

## 2025-03-24 PROCEDURE — 88342 IMHCHEM/IMCYTCHM 1ST ANTB: CPT | Mod: 26 | Performed by: PATHOLOGY

## 2025-03-24 PROCEDURE — 250N000011 HC RX IP 250 OP 636: Performed by: NURSE ANESTHETIST, CERTIFIED REGISTERED

## 2025-03-24 PROCEDURE — 88342 IMHCHEM/IMCYTCHM 1ST ANTB: CPT | Mod: TC | Performed by: INTERNAL MEDICINE

## 2025-03-24 PROCEDURE — 43239 EGD BIOPSY SINGLE/MULTIPLE: CPT | Performed by: INTERNAL MEDICINE

## 2025-03-24 PROCEDURE — 370N000017 HC ANESTHESIA TECHNICAL FEE, PER MIN: Performed by: INTERNAL MEDICINE

## 2025-03-24 PROCEDURE — 999N000010 HC STATISTIC ANES STAT CODE-CRNA PER MINUTE: Performed by: INTERNAL MEDICINE

## 2025-03-24 PROCEDURE — 250N000009 HC RX 250: Performed by: NURSE ANESTHETIST, CERTIFIED REGISTERED

## 2025-03-24 PROCEDURE — 88305 TISSUE EXAM BY PATHOLOGIST: CPT | Mod: 26 | Performed by: PATHOLOGY

## 2025-03-24 PROCEDURE — 250N000025 HC SEVOFLURANE, PER MIN: Performed by: INTERNAL MEDICINE

## 2025-03-24 PROCEDURE — 88341 IMHCHEM/IMCYTCHM EA ADD ANTB: CPT | Mod: TC | Performed by: INTERNAL MEDICINE

## 2025-03-24 RX ORDER — ONDANSETRON 2 MG/ML
4 INJECTION INTRAMUSCULAR; INTRAVENOUS EVERY 30 MIN PRN
Status: DISCONTINUED | OUTPATIENT
Start: 2025-03-24 | End: 2025-03-24 | Stop reason: HOSPADM

## 2025-03-24 RX ORDER — FENTANYL CITRATE 50 UG/ML
50 INJECTION, SOLUTION INTRAMUSCULAR; INTRAVENOUS EVERY 5 MIN PRN
Status: DISCONTINUED | OUTPATIENT
Start: 2025-03-24 | End: 2025-03-24 | Stop reason: HOSPADM

## 2025-03-24 RX ORDER — LIDOCAINE 40 MG/G
CREAM TOPICAL
Status: CANCELLED | OUTPATIENT
Start: 2025-03-24

## 2025-03-24 RX ORDER — ONDANSETRON 2 MG/ML
4 INJECTION INTRAMUSCULAR; INTRAVENOUS
Status: CANCELLED | OUTPATIENT
Start: 2025-03-24

## 2025-03-24 RX ORDER — PROCHLORPERAZINE MALEATE 10 MG
10 TABLET ORAL EVERY 6 HOURS PRN
Status: DISCONTINUED | OUTPATIENT
Start: 2025-03-24 | End: 2025-03-24 | Stop reason: HOSPADM

## 2025-03-24 RX ORDER — POLYETHYLENE GLYCOL 3350 17 G/17G
1 POWDER, FOR SOLUTION ORAL PRN
COMMUNITY

## 2025-03-24 RX ORDER — DEXAMETHASONE SODIUM PHOSPHATE 4 MG/ML
INJECTION, SOLUTION INTRA-ARTICULAR; INTRALESIONAL; INTRAMUSCULAR; INTRAVENOUS; SOFT TISSUE PRN
Status: DISCONTINUED | OUTPATIENT
Start: 2025-03-24 | End: 2025-03-24

## 2025-03-24 RX ORDER — NALOXONE HYDROCHLORIDE 0.4 MG/ML
0.2 INJECTION, SOLUTION INTRAMUSCULAR; INTRAVENOUS; SUBCUTANEOUS
Status: DISCONTINUED | OUTPATIENT
Start: 2025-03-24 | End: 2025-03-24 | Stop reason: HOSPADM

## 2025-03-24 RX ORDER — ONDANSETRON 4 MG/1
4 TABLET, ORALLY DISINTEGRATING ORAL EVERY 6 HOURS PRN
Status: DISCONTINUED | OUTPATIENT
Start: 2025-03-24 | End: 2025-03-24 | Stop reason: HOSPADM

## 2025-03-24 RX ORDER — NALOXONE HYDROCHLORIDE 0.4 MG/ML
0.4 INJECTION, SOLUTION INTRAMUSCULAR; INTRAVENOUS; SUBCUTANEOUS
Status: DISCONTINUED | OUTPATIENT
Start: 2025-03-24 | End: 2025-03-24 | Stop reason: HOSPADM

## 2025-03-24 RX ORDER — NALOXONE HYDROCHLORIDE 0.4 MG/ML
0.1 INJECTION, SOLUTION INTRAMUSCULAR; INTRAVENOUS; SUBCUTANEOUS
Status: DISCONTINUED | OUTPATIENT
Start: 2025-03-24 | End: 2025-03-24 | Stop reason: HOSPADM

## 2025-03-24 RX ORDER — FENTANYL CITRATE 50 UG/ML
25 INJECTION, SOLUTION INTRAMUSCULAR; INTRAVENOUS EVERY 5 MIN PRN
Status: DISCONTINUED | OUTPATIENT
Start: 2025-03-24 | End: 2025-03-24 | Stop reason: HOSPADM

## 2025-03-24 RX ORDER — ONDANSETRON 2 MG/ML
INJECTION INTRAMUSCULAR; INTRAVENOUS PRN
Status: DISCONTINUED | OUTPATIENT
Start: 2025-03-24 | End: 2025-03-24

## 2025-03-24 RX ORDER — SODIUM CHLORIDE, SODIUM LACTATE, POTASSIUM CHLORIDE, CALCIUM CHLORIDE 600; 310; 30; 20 MG/100ML; MG/100ML; MG/100ML; MG/100ML
INJECTION, SOLUTION INTRAVENOUS CONTINUOUS PRN
Status: DISCONTINUED | OUTPATIENT
Start: 2025-03-24 | End: 2025-03-24

## 2025-03-24 RX ORDER — PROPOFOL 10 MG/ML
INJECTION, EMULSION INTRAVENOUS PRN
Status: DISCONTINUED | OUTPATIENT
Start: 2025-03-24 | End: 2025-03-24

## 2025-03-24 RX ORDER — DEXAMETHASONE SODIUM PHOSPHATE 4 MG/ML
4 INJECTION, SOLUTION INTRA-ARTICULAR; INTRALESIONAL; INTRAMUSCULAR; INTRAVENOUS; SOFT TISSUE
Status: DISCONTINUED | OUTPATIENT
Start: 2025-03-24 | End: 2025-03-24 | Stop reason: HOSPADM

## 2025-03-24 RX ORDER — FLUMAZENIL 0.1 MG/ML
0.2 INJECTION, SOLUTION INTRAVENOUS
Status: DISCONTINUED | OUTPATIENT
Start: 2025-03-24 | End: 2025-03-24 | Stop reason: HOSPADM

## 2025-03-24 RX ORDER — HYDROMORPHONE HCL IN WATER/PF 6 MG/30 ML
0.4 PATIENT CONTROLLED ANALGESIA SYRINGE INTRAVENOUS EVERY 5 MIN PRN
Status: DISCONTINUED | OUTPATIENT
Start: 2025-03-24 | End: 2025-03-24 | Stop reason: HOSPADM

## 2025-03-24 RX ORDER — SODIUM CHLORIDE, SODIUM LACTATE, POTASSIUM CHLORIDE, CALCIUM CHLORIDE 600; 310; 30; 20 MG/100ML; MG/100ML; MG/100ML; MG/100ML
INJECTION, SOLUTION INTRAVENOUS CONTINUOUS
Status: DISCONTINUED | OUTPATIENT
Start: 2025-03-24 | End: 2025-03-24 | Stop reason: HOSPADM

## 2025-03-24 RX ORDER — ONDANSETRON 2 MG/ML
4 INJECTION INTRAMUSCULAR; INTRAVENOUS EVERY 6 HOURS PRN
Status: DISCONTINUED | OUTPATIENT
Start: 2025-03-24 | End: 2025-03-24 | Stop reason: HOSPADM

## 2025-03-24 RX ORDER — HYDROMORPHONE HCL IN WATER/PF 6 MG/30 ML
0.2 PATIENT CONTROLLED ANALGESIA SYRINGE INTRAVENOUS EVERY 5 MIN PRN
Status: DISCONTINUED | OUTPATIENT
Start: 2025-03-24 | End: 2025-03-24 | Stop reason: HOSPADM

## 2025-03-24 RX ORDER — ONDANSETRON 4 MG/1
4 TABLET, ORALLY DISINTEGRATING ORAL EVERY 30 MIN PRN
Status: DISCONTINUED | OUTPATIENT
Start: 2025-03-24 | End: 2025-03-24 | Stop reason: HOSPADM

## 2025-03-24 RX ADMIN — PROPOFOL 150 MG: 10 INJECTION, EMULSION INTRAVENOUS at 08:30

## 2025-03-24 RX ADMIN — ONDANSETRON 4 MG: 2 INJECTION INTRAMUSCULAR; INTRAVENOUS at 08:36

## 2025-03-24 RX ADMIN — SODIUM CHLORIDE, SODIUM LACTATE, POTASSIUM CHLORIDE, AND CALCIUM CHLORIDE: .6; .31; .03; .02 INJECTION, SOLUTION INTRAVENOUS at 08:24

## 2025-03-24 RX ADMIN — DEXAMETHASONE SODIUM PHOSPHATE 4 MG: 4 INJECTION, SOLUTION INTRA-ARTICULAR; INTRALESIONAL; INTRAMUSCULAR; INTRAVENOUS; SOFT TISSUE at 08:36

## 2025-03-24 RX ADMIN — ROCURONIUM BROMIDE 30 MG: 50 INJECTION, SOLUTION INTRAVENOUS at 08:30

## 2025-03-24 ASSESSMENT — ACTIVITIES OF DAILY LIVING (ADL)
ADLS_ACUITY_SCORE: 41
ADLS_ACUITY_SCORE: 41

## 2025-03-24 NOTE — ANESTHESIA POSTPROCEDURE EVALUATION
Patient: Madeline Person    Procedure: Procedure(s):  ESOPHAGOGASTRODUODENOSCOPY, WITH BIOPSY       Anesthesia Type:  General    Note:  Disposition: Outpatient   Postop Pain Control: Uneventful            Sign Out: Well controlled pain   PONV: No   Neuro/Psych: Uneventful            Sign Out: Acceptable/Baseline neuro status   Airway/Respiratory: Uneventful            Sign Out: Acceptable/Baseline resp. status   CV/Hemodynamics: Uneventful            Sign Out: Acceptable CV status   Other NRE: NONE   DID A NON-ROUTINE EVENT OCCUR? No           Last vitals:  Vitals Value Taken Time   /85 03/24/25 0915   Temp 36.7  C (98  F) 03/24/25 0915   Pulse 95 03/24/25 0915   Resp 17 03/24/25 0915   SpO2 99 % 03/24/25 0915       Electronically Signed By: Roberto Vines MD  March 24, 2025  11:29 AM

## 2025-03-24 NOTE — ANESTHESIA CARE TRANSFER NOTE
Patient: Madeline Person    Procedure: Procedure(s):  ESOPHAGOGASTRODUODENOSCOPY, WITH BIOPSY       Diagnosis: Eosinophilic esophagitis [K20.0]  Diagnosis Additional Information: No value filed.    Anesthesia Type:   General     Note:    Oropharynx: oropharynx clear of all foreign objects  Level of Consciousness: awake  Oxygen Supplementation: nasal cannula  Level of Supplemental Oxygen (L/min / FiO2): 3  Independent Airway: airway patency satisfactory and stable  Dentition: dentition unchanged  Vital Signs Stable: post-procedure vital signs reviewed and stable  Report to RN Given: handoff report given  Patient transferred to: PACU    Handoff Report: Identifed the Patient, Identified the Reponsible Provider, Reviewed the pertinent medical history, Discussed the surgical course, Reviewed Intra-OP anesthesia mangement and issues during anesthesia, Set expectations for post-procedure period and Allowed opportunity for questions and acknowledgement of understanding      Vitals:  Vitals Value Taken Time   /84 03/24/25 0854   Temp     Pulse 103 03/24/25 0857   Resp 16 03/24/25 0857   SpO2 98 % 03/24/25 0857   Vitals shown include unfiled device data.    Electronically Signed By: JOSE FRANCISCO Bruno CRNA  March 24, 2025  9:04 AM

## 2025-03-24 NOTE — ANESTHESIA PROCEDURE NOTES
Airway       Patient location during procedure: OR       Procedure Start/Stop Times: 3/24/2025 8:31 AM  Staff -        CRNA: Gala Vick APRN CRNA       Performed By: CRNA  Consent for Airway        Urgency: elective  Indications and Patient Condition       Indications for airway management: low-procedural       Induction type:intravenous       Mask difficulty assessment: 2 - vent by mask + OA or adjuvant +/- NMBA    Final Airway Details       Final airway type: endotracheal airway       Successful airway: ETT - single  Endotracheal Airway Details        ETT size (mm): 7.0       Cuffed: yes       Successful intubation technique: video laryngoscopy       VL Blade Size: Kong 3       Grade View of Cords: 1       Adjucts: stylet       Position: Right       Measured from: gums/teeth       Secured at (cm): 22       Bite block used: None    Post intubation assessment        Placement verified by: capnometry, equal breath sounds and chest rise        Number of attempts at approach: 1       Secured with: tape       Ease of procedure: easy       Dentition: Intact and Unchanged    Medication(s) Administered   Medication Administration Time: 3/24/2025 8:31 AM

## 2025-03-24 NOTE — ANESTHESIA PREPROCEDURE EVALUATION
Anesthesia Pre-Procedure Evaluation    Patient: Madeline Person   MRN: [id number] : 1990        Procedure : Procedure(s):  ESOPHAGOGASTRODUODENOSCOPY          Past Medical History:   Diagnosis Date    Cerebral palsy (H)     Infantile spasms (H) 2023    PONV (postoperative nausea and vomiting)     Spastic quadriplegia (H)       Past Surgical History:   Procedure Laterality Date    ------------OTHER-------------      Baclofen Pump Insertion    ------------OTHER-------------      bilateral femur osteotomy    ESOPHAGOSCOPY, GASTROSCOPY, DUODENOSCOPY (EGD), COMBINED N/A 2024    Procedure: ESOPHAGOGASTRODUODENOSCOPY, WITH BIOPSY;  Surgeon: George Pelayo MD;  Location:  GI    EXAM UNDER ANESTHESIA DENTAL, RESTORATION N/A 2020    Procedure: Dental Exam, Radiographs, Periodontal Therapy, flouride varnish;  Surgeon: Blair Borges DDS;  [facility]    EXAM UNDER ANESTHESIA, RESTORATIONS, EXTRACTION(S) DENTAL COMPLEX, COMBINED N/A 2015    Procedure: COMBINED EXAM UNDER ANESTHESIA, RESTORATIONS, EXTRACTION(S) DENTAL COMPLEX;  Surgeon: Angelica Rubin DDS;  [facility]    EXAM UNDER ANESTHESIA, RESTORATIONS, EXTRACTION(S) DENTAL COMPLEX, COMBINED N/A 2023    Procedure: Bilateral dental exam, radiographs, periodontal therapy, fluoride varnish in the mouth;  Surgeon: Pawel Beckham DDS;  Location: [facility]    EXTRACTION(S) DENTAL N/A 2023    Procedure: Extraction of #17, 32;  Surgeon: Pawel Beckham DDS;  Location: [facility]    FOOT SURGERY      ORTHOPEDIC SURGERY        Allergies   Allergen Reactions    Latex      Other reaction(s): Unknown    Seasonal      Other reaction(s): Unknown      Social History     Tobacco Use    Smoking status: Never     Passive exposure: Never    Smokeless tobacco: Never   Substance Use Topics    Alcohol use: No      Wt Readings from Last 1 Encounters:   25 64.4 kg (142 lb)        Anesthesia Evaluation            ROS/MED  HX  ENT/Pulmonary:    (-) sleep apnea   Neurologic: Comment: Spastic quadriplegia; cerebral palsy; wheelchair dependent;    (+)                         Developmental delay,       Cardiovascular:       METS/Exercise Tolerance:  Comment: Unable to assess with wheelchair dependence and developmental delay   Hematologic:       Musculoskeletal:       GI/Hepatic:     (+) GERD,                   Renal/Genitourinary:       Endo:    (-) obesity   Psychiatric/Substance Use:       Infectious Disease:       Malignancy:       Other:            Physical Exam    Airway        Mallampati: II   TM distance: > 3 FB   Neck ROM: full   Mouth opening: > 3 cm    Respiratory Devices and Support         Dental       (+) Modest Abnormalities - crowns, retainers, 1 or 2 missing teeth      Cardiovascular   cardiovascular exam normal          Pulmonary   pulmonary exam normal                OUTSIDE LABS:  CBC:   Lab Results   Component Value Date    WBC 6.7 01/22/2020    WBC 12.8 (H) 07/04/2010    HGB 13.2 01/22/2020    HGB 13.6 05/14/2019    HCT 39.8 01/22/2020    HCT 39.6 07/04/2010     01/22/2020     07/04/2010     BMP:   Lab Results   Component Value Date     03/04/2025     01/15/2024    POTASSIUM 4.0 03/04/2025    POTASSIUM 3.9 01/15/2024    CHLORIDE 105 03/04/2025    CHLORIDE 105 01/15/2024    CO2 23 03/04/2025    CO2 25 01/15/2024    BUN 17.6 03/04/2025    BUN 10.8 01/15/2024    CR 0.55 03/04/2025    CR 0.51 01/15/2024    GLC 84 03/04/2025    GLC 96 01/15/2024     COAGS:   Lab Results   Component Value Date    PTT 36 01/22/2020    INR 1.07 01/22/2020     POC:   Lab Results   Component Value Date    HCG Negative 02/05/2020    HCGS Negative 01/22/2020     HEPATIC:   Lab Results   Component Value Date    ALBUMIN 4.5 03/04/2025    PROTTOTAL 7.7 03/04/2025    ALT 18 03/04/2025    AST 21 03/04/2025    ALKPHOS 62 03/04/2025    BILITOTAL 0.2 03/04/2025     OTHER:   Lab Results   Component Value Date    GRANT 9.6  03/04/2025    TSH 1.78 05/14/2019       Anesthesia Plan    ASA Status:  3    NPO Status:  NPO Appropriate    Anesthesia Type: General.     - Airway: ETT   Induction: Intravenous.   Maintenance: Balanced.   Techniques and Equipment:     - Airway: Video-Laryngoscope       Consents    Anesthesia Plan(s) and associated risks, benefits, and realistic alternatives discussed. Questions answered and patient/representative(s) expressed understanding.     - Discussed:     - Discussed with:  Patient, Parent (Mother and/or Father)            Postoperative Care    Pain management: IV analgesics.   PONV prophylaxis: Ondansetron (or other 5HT-3), Dexamethasone or Solumedrol     Comments:               Roberto Vines MD    Clinically Significant Risk Factors Present on Admission                             # Overweight: Estimated body mass index is 27.73 kg/m  as calculated from the following:    Height as of 3/10/25: 1.524 m (5').    Weight as of 3/4/25: 64.4 kg (142 lb).

## 2025-03-26 LAB
PATH REPORT.ADDENDUM SPEC: NORMAL
PATH REPORT.COMMENTS IMP SPEC: NORMAL
PATH REPORT.COMMENTS IMP SPEC: NORMAL
PATH REPORT.FINAL DX SPEC: NORMAL
PATH REPORT.GROSS SPEC: NORMAL
PATH REPORT.MICROSCOPIC SPEC OTHER STN: NORMAL
PATH REPORT.RELEVANT HX SPEC: NORMAL
PHOTO IMAGE: NORMAL

## 2025-05-12 ENCOUNTER — TRANSFERRED RECORDS (OUTPATIENT)
Dept: HEALTH INFORMATION MANAGEMENT | Facility: CLINIC | Age: 35
End: 2025-05-12

## (undated) DEVICE — BASIN SET MAJOR

## (undated) DEVICE — SPONGE SURGIFOAM 12 1972

## (undated) DEVICE — SOL WATER IRRIG 1000ML BOTTLE 2F7114

## (undated) DEVICE — PACK SET-UP STD 9102

## (undated) DEVICE — LINEN ORTHO PACK 5446

## (undated) DEVICE — LIGHT HANDLE X2

## (undated) DEVICE — TUBING SUCTION MEDI-VAC 1/4"X20' N620A

## (undated) DEVICE — SUCTION TIP YANKAUER W/O VENT K86

## (undated) DEVICE — PREP POVIDONE IODINE SWABSTICKS TRIPLE PACK MDS093902A

## (undated) DEVICE — PREP POVIDONE IODINE SOLUTION 10% 120ML

## (undated) DEVICE — SOL HYDROGEN PEROXIDE 3% 4OZ BOTTLE F0010

## (undated) DEVICE — GLOVE PROTEXIS W/NEU-THERA 8.5  2D73TE85

## (undated) DEVICE — PREP POVIDONE IODINE 10% SWABSTICKS TRIPLE PACK AS-PVPSBPT

## (undated) DEVICE — COVER PROBE ULTRASOUND 3D W/GEL 5X96" LF 20-P3D596

## (undated) DEVICE — BUR STRK SIDE CUT 1.2X5.1X44.8MM CARBIDE 6FLUTE 1607-002-105

## (undated) DEVICE — STRAP KNEE/BODY 31143004

## (undated) DEVICE — TOOTHBRUSH ADULT NON STERILE MDS136850

## (undated) DEVICE — POSITIONER ARMBOARD FOAM 1PAIR LF FP-ARMB1

## (undated) DEVICE — SPONGE RAY-TEC 4X8" 7318

## (undated) DEVICE — SOL NACL 0.9% IRRIG 1000ML BOTTLE 2F7124

## (undated) DEVICE — LINEN GOWN X4 5410

## (undated) DEVICE — SUCTION CANISTER MEDIVAC LINER 1500ML W/LID 65651-515

## (undated) DEVICE — LINEN GOWN OVERSIZE 5408

## (undated) DEVICE — ANTIFOG SOLUTION W/FOAM PAD 31142527

## (undated) DEVICE — BUR STRK SIDE CUT 2.1X5.1X44.8MM CARBIDE 6FLUTE 1607-002-109

## (undated) DEVICE — SYR EAR BULB 3OZ 0035830

## (undated) DEVICE — LABEL MEDICATION SYSTEM 3303-P

## (undated) DEVICE — BRUSH SURGICAL SCRUB PLAIN STERILE 4454A

## (undated) DEVICE — RX BACITRACIN OINTMENT 0.9G 1/32OZ 01680 11109

## (undated) DEVICE — SYR EAR 3OZ BULB IRR STRL DISP BLU PVC 4173

## (undated) RX ORDER — CHLORHEXIDINE GLUCONATE ORAL RINSE 1.2 MG/ML
SOLUTION DENTAL
Status: DISPENSED
Start: 2023-05-18

## (undated) RX ORDER — OXYMETAZOLINE HYDROCHLORIDE 0.05 G/100ML
SPRAY NASAL
Status: DISPENSED
Start: 2023-05-18

## (undated) RX ORDER — PROPOFOL 10 MG/ML
INJECTION, EMULSION INTRAVENOUS
Status: DISPENSED
Start: 2023-05-18

## (undated) RX ORDER — FENTANYL CITRATE 50 UG/ML
INJECTION, SOLUTION INTRAMUSCULAR; INTRAVENOUS
Status: DISPENSED
Start: 2023-05-18

## (undated) RX ORDER — ACETAMINOPHEN 325 MG/1
TABLET ORAL
Status: DISPENSED
Start: 2023-05-18

## (undated) RX ORDER — ONDANSETRON 2 MG/ML
INJECTION INTRAMUSCULAR; INTRAVENOUS
Status: DISPENSED
Start: 2023-05-18

## (undated) RX ORDER — DEXAMETHASONE SODIUM PHOSPHATE 4 MG/ML
INJECTION, SOLUTION INTRA-ARTICULAR; INTRALESIONAL; INTRAMUSCULAR; INTRAVENOUS; SOFT TISSUE
Status: DISPENSED
Start: 2023-05-18

## (undated) RX ORDER — FENTANYL CITRATE 50 UG/ML
INJECTION, SOLUTION INTRAMUSCULAR; INTRAVENOUS
Status: DISPENSED
Start: 2020-01-08

## (undated) RX ORDER — FENTANYL CITRATE-0.9 % NACL/PF 10 MCG/ML
PLASTIC BAG, INJECTION (ML) INTRAVENOUS
Status: DISPENSED
Start: 2023-05-18

## (undated) RX ORDER — CHLORHEXIDINE GLUCONATE ORAL RINSE 1.2 MG/ML
SOLUTION DENTAL
Status: DISPENSED
Start: 2020-01-08

## (undated) RX ORDER — PHENYLEPHRINE HCL IN 0.9% NACL 1 MG/10 ML
SYRINGE (ML) INTRAVENOUS
Status: DISPENSED
Start: 2020-01-08